# Patient Record
Sex: FEMALE | Race: WHITE | NOT HISPANIC OR LATINO | Employment: OTHER | URBAN - METROPOLITAN AREA
[De-identification: names, ages, dates, MRNs, and addresses within clinical notes are randomized per-mention and may not be internally consistent; named-entity substitution may affect disease eponyms.]

---

## 2024-01-04 ENCOUNTER — ANCILLARY PROCEDURE (OUTPATIENT)
Dept: RADIOLOGY | Facility: CLINIC | Age: 74
End: 2024-01-04
Payer: MEDICARE

## 2024-01-04 DIAGNOSIS — Z12.31 ENCOUNTER FOR SCREENING MAMMOGRAM FOR MALIGNANT NEOPLASM OF BREAST: ICD-10-CM

## 2024-01-04 PROCEDURE — 77067 SCR MAMMO BI INCL CAD: CPT | Performed by: RADIOLOGY

## 2024-01-04 PROCEDURE — 77063 BREAST TOMOSYNTHESIS BI: CPT | Performed by: RADIOLOGY

## 2024-01-04 PROCEDURE — 77063 BREAST TOMOSYNTHESIS BI: CPT

## 2024-01-09 ENCOUNTER — ANCILLARY PROCEDURE (OUTPATIENT)
Dept: RADIOLOGY | Facility: CLINIC | Age: 74
End: 2024-01-09
Payer: MEDICARE

## 2024-01-09 DIAGNOSIS — Z78.0 ASYMPTOMATIC MENOPAUSAL STATE: ICD-10-CM

## 2024-01-09 PROCEDURE — 77085 DXA BONE DENSITY AXL VRT FX: CPT

## 2024-01-09 PROCEDURE — 77085 DXA BONE DENSITY AXL VRT FX: CPT | Performed by: RADIOLOGY

## 2024-03-07 ENCOUNTER — OFFICE VISIT (OUTPATIENT)
Dept: PRIMARY CARE | Facility: CLINIC | Age: 74
End: 2024-03-07
Payer: MEDICARE

## 2024-03-07 VITALS
TEMPERATURE: 97.7 F | HEART RATE: 87 BPM | WEIGHT: 126.5 LBS | SYSTOLIC BLOOD PRESSURE: 109 MMHG | DIASTOLIC BLOOD PRESSURE: 64 MMHG | OXYGEN SATURATION: 94 %

## 2024-03-07 DIAGNOSIS — J98.01 BRONCHOSPASM: ICD-10-CM

## 2024-03-07 DIAGNOSIS — Z13.1 SCREENING FOR DIABETES MELLITUS (DM): ICD-10-CM

## 2024-03-07 DIAGNOSIS — Z13.220 SCREENING, LIPID: ICD-10-CM

## 2024-03-07 DIAGNOSIS — L30.8 OTHER ECZEMA: ICD-10-CM

## 2024-03-07 DIAGNOSIS — Z13.29 SCREENING FOR THYROID DISORDER: ICD-10-CM

## 2024-03-07 DIAGNOSIS — Z13.0 SCREENING, ANEMIA, DEFICIENCY, IRON: ICD-10-CM

## 2024-03-07 DIAGNOSIS — N95.2 ATROPHIC VAGINITIS: Primary | ICD-10-CM

## 2024-03-07 PROCEDURE — 1036F TOBACCO NON-USER: CPT | Performed by: FAMILY MEDICINE

## 2024-03-07 PROCEDURE — 1160F RVW MEDS BY RX/DR IN RCRD: CPT | Performed by: FAMILY MEDICINE

## 2024-03-07 PROCEDURE — 99204 OFFICE O/P NEW MOD 45 MIN: CPT | Performed by: FAMILY MEDICINE

## 2024-03-07 PROCEDURE — 1159F MED LIST DOCD IN RCRD: CPT | Performed by: FAMILY MEDICINE

## 2024-03-07 RX ORDER — CHOLECALCIFEROL (VITAMIN D3) 125 MCG
CAPSULE ORAL
COMMUNITY

## 2024-03-07 RX ORDER — ESTRADIOL 0.1 MG/G
CREAM VAGINAL
Qty: 42.5 G | Refills: 1 | Status: SHIPPED | OUTPATIENT
Start: 2024-03-07

## 2024-03-07 RX ORDER — ALBUTEROL SULFATE 90 UG/1
2 AEROSOL, METERED RESPIRATORY (INHALATION) EVERY 4 HOURS PRN
Qty: 18 G | Refills: 1 | Status: SHIPPED | OUTPATIENT
Start: 2024-03-07

## 2024-03-07 RX ORDER — PSYLLIUM HUSK 0.4 G
CAPSULE ORAL
COMMUNITY

## 2024-03-07 RX ORDER — PROMETHAZINE HYDROCHLORIDE 25 MG/1
TABLET ORAL
COMMUNITY
Start: 2017-06-14 | End: 2024-03-07 | Stop reason: WASHOUT

## 2024-03-07 RX ORDER — DIFLUPREDNATE OPHTHALMIC 0.5 MG/ML
1 EMULSION OPHTHALMIC 4 TIMES DAILY
COMMUNITY
Start: 2021-12-01 | End: 2024-03-07 | Stop reason: WASHOUT

## 2024-03-07 RX ORDER — CETIRIZINE HYDROCHLORIDE 10 MG/1
TABLET ORAL
COMMUNITY
Start: 2017-04-29 | End: 2024-03-07 | Stop reason: WASHOUT

## 2024-03-07 RX ORDER — ACETAMINOPHEN 500 MG
500 TABLET ORAL EVERY 4 HOURS PRN
COMMUNITY

## 2024-03-07 RX ORDER — ECONAZOLE NITRATE 10 MG/G
CREAM TOPICAL
COMMUNITY
Start: 2023-11-16 | End: 2024-03-07 | Stop reason: WASHOUT

## 2024-03-07 RX ORDER — PERPHENAZINE/AMITRIPTYLINE HCL 4MG-10MG
TABLET ORAL
COMMUNITY
End: 2024-04-02 | Stop reason: WASHOUT

## 2024-03-07 RX ORDER — BENZOCAINE .13; .15; .5; 2 G/100G; G/100G; G/100G; G/100G
GEL ORAL
COMMUNITY

## 2024-03-07 RX ORDER — IPRATROPIUM BROMIDE 42 UG/1
SPRAY, METERED NASAL
COMMUNITY
Start: 2018-03-08 | End: 2024-03-07 | Stop reason: WASHOUT

## 2024-03-07 RX ORDER — ZOLPIDEM TARTRATE 5 MG/1
10 TABLET ORAL NIGHTLY
COMMUNITY
End: 2024-03-07 | Stop reason: WASHOUT

## 2024-03-07 RX ORDER — VERAPAMIL HYDROCHLORIDE 40 MG/1
TABLET ORAL
COMMUNITY
Start: 2009-03-23 | End: 2024-03-07 | Stop reason: WASHOUT

## 2024-03-07 RX ORDER — CYCLOSPORINE 0.5 MG/ML
EMULSION OPHTHALMIC
COMMUNITY
Start: 2024-02-05

## 2024-03-07 RX ORDER — POLYETHYLENE GLYCOL 400 AND PROPYLENE GLYCOL 4; 3 MG/ML; MG/ML
1 SOLUTION/ DROPS OPHTHALMIC 4 TIMES DAILY PRN
COMMUNITY

## 2024-03-07 RX ORDER — LOTEPREDNOL ETABONATE 2 MG/ML
SUSPENSION/ DROPS OPHTHALMIC
COMMUNITY
Start: 2017-05-19 | End: 2024-03-07 | Stop reason: WASHOUT

## 2024-03-07 RX ORDER — MAGNESIUM 200 MG
TABLET ORAL
COMMUNITY
End: 2024-04-02 | Stop reason: WASHOUT

## 2024-03-07 RX ORDER — OMEPRAZOLE 40 MG/1
CAPSULE, DELAYED RELEASE ORAL
COMMUNITY
Start: 2016-10-03 | End: 2024-03-07 | Stop reason: WASHOUT

## 2024-03-07 RX ORDER — VIT C/E/ZN/COPPR/LUTEIN/ZEAXAN 250MG-90MG
CAPSULE ORAL
COMMUNITY

## 2024-03-07 RX ORDER — MOXIFLOXACIN 5 MG/ML
1 SOLUTION/ DROPS OPHTHALMIC 2 TIMES DAILY
COMMUNITY

## 2024-03-07 RX ORDER — ALBUTEROL SULFATE 90 UG/1
2 AEROSOL, METERED RESPIRATORY (INHALATION) EVERY 4 HOURS PRN
COMMUNITY
Start: 2016-12-12 | End: 2024-03-07 | Stop reason: SDUPTHER

## 2024-03-07 RX ORDER — DM/P-EPHED/ACETAMINOPH/DOXYLAM
LIQUID (ML) ORAL
COMMUNITY

## 2024-03-07 RX ORDER — FLAXSEED OIL 1000 MG
CAPSULE ORAL
COMMUNITY

## 2024-03-07 RX ORDER — BETAMETHASONE DIPROPIONATE 0.5 MG/G
LOTION TOPICAL 2 TIMES DAILY PRN
Qty: 60 ML | Refills: 0 | Status: SHIPPED | OUTPATIENT
Start: 2024-03-07 | End: 2025-03-07

## 2024-03-07 RX ORDER — LIDOCAINE 50 MG/G
PATCH TOPICAL
COMMUNITY
Start: 2019-02-19 | End: 2024-03-07 | Stop reason: WASHOUT

## 2024-03-07 RX ORDER — METHYLPREDNISOLONE 4 MG/1
TABLET ORAL
COMMUNITY
Start: 2024-01-25 | End: 2024-03-07 | Stop reason: WASHOUT

## 2024-03-07 RX ORDER — PANTOPRAZOLE SODIUM 40 MG/1
TABLET, DELAYED RELEASE ORAL
COMMUNITY
Start: 2018-11-28 | End: 2024-03-07 | Stop reason: WASHOUT

## 2024-03-07 RX ORDER — BETAMETHASONE DIPROPIONATE 0.5 MG/G
LOTION TOPICAL 2 TIMES DAILY PRN
Qty: 60 ML | Refills: 0 | Status: SHIPPED | OUTPATIENT
Start: 2024-03-07 | End: 2024-03-07 | Stop reason: SDUPTHER

## 2024-03-07 RX ORDER — ZINC GLUCONATE 50 MG
50 TABLET ORAL
COMMUNITY
End: 2024-04-02 | Stop reason: WASHOUT

## 2024-03-07 RX ORDER — CEVIMELINE HYDROCHLORIDE 30 MG/1
1 CAPSULE ORAL 2 TIMES DAILY
COMMUNITY
Start: 2018-08-21 | End: 2024-03-07 | Stop reason: WASHOUT

## 2024-03-07 NOTE — PROGRESS NOTES
Subjective   Patient ID: Mariah Kelly is a 73 y.o. female who presents for New Patient Visit, Dry skin (Pt has a dry patch on her back that itches. ), PT Treatment (Patient is seeing PT for shoulder and back issues and is under care with a doctor at [facility]. 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  HPI  New to the practice  Dtr is a pt here- keya merino   ; she and spouse are establishing with me.  They moved from [facility] to this area .  They are retired .     They both had AWV in November and wish to plan for the same this year.  They are here today to meet me / get established.       PMHx  Chronic keratitis  / [place] of [place]   Right rotator cuff tear  - seeing PT and Ortho at [facility]   Back problem   -lower -seeing [facility]     Recurrent bronchitis intermittent wheezing when has a bad case  - albuterol relieves it .   Itchy area on back . Was prescribed a cream, very expensive, did not fill it.  Uses aveeno lotion  .  Heat (shower)makes it itch more.     Vaginal dryness/ menopause.     Has recently had a routine mammo ,  and a bone density .      Review of Systems  Denies hx of heart disease,  diabetes,  breast ca , colon polyps .  Nonsmoker.       Current Outpatient Medications   Medication Sig Dispense Refill    acetaminophen (Tylenol) 500 mg tablet Take 1 tablet (500 mg) by mouth every 4 hours if needed.      B complex-vitamin C-folic acid (Nephro-Isabel Rx) 1- mg-mg-mcg tablet Take 1 tablet by mouth once daily with breakfast.      biotin 5,000 mcg tablet,disintegrating Take by mouth.      budesonide (Rhinocort AQ) 32 mcg/actuation nasal spray APPLY 1 TO 2 Sprays in each nostril once  DAilY      cholecalciferol (Vitamin D-3) 25 MCG (1000 UT) capsule Take by mouth once daily.      coconut oiL 1,000 mg capsule Take by mouth once daily.      flaxseed oiL 1,000 mg capsule Take by mouth.      lactobacillus  (Culturelle) 10 billion cell capsule Take 1 capsule by mouth once daily.      magnesium 200 mg tablet Take by mouth.      moxifloxacin (Vigamox) 0.5 % ophthalmic solution Administer 1 drop into the left eye 2 times a day.      oregano oiL 1,500 mg capsule Take by mouth once daily.      peg 400-propylene glycol (Systane Ultra) 0.4-0.3 % drops ophthalmic drops Administer 1 drop into affected eye(s) 4 times a day as needed.      psyllium (Metamucil) 0.4 gram capsule Take by mouth once daily.      Restasis 0.05 % ophthalmic emulsion USE ONE DROP INTO THE SURGICAL EYE EVERY 12 HOURS      zinc gluconate 50 mg tablet Take 1 tablet (50 mg) by mouth once daily.      albuterol 90 mcg/actuation inhaler Inhale 2 puffs every 4 hours if needed for wheezing or shortness of breath. 18 g 1    betamethasone dipropionate (Diprosone) 0.05 % lotion Apply topically 2 times a day as needed for irritation or rash. 60 mL 0    estradiol (Estrace) 0.01 % (0.1 mg/gram) vaginal cream 0.5 gm on external genitalia  and vaginal x 10 days then 3 x a week . 42.5 g 1     No current facility-administered medications for this visit.       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     Objective   /64 (BP Location: Right arm, Patient Position: Sitting, BP Cuff Size: Adult)   Pulse 87   Temp 36.5 °C (97.7 °F) (Temporal)   Wt 57.4 kg (126 lb 8 oz)   SpO2 94%     Physical Exam  Vitals reviewed.   Constitutional:       General: She is not in acute distress.  Cardiovascular:      Rate and Rhythm: Normal rate and regular rhythm.      Heart sounds: Normal heart sounds.   Pulmonary:      Effort: Pulmonary effort is normal.      Breath sounds: No wheezing or rales.   Neurological:      General: No focal deficit present.      Mental Status: She is alert.       Skin: low back:  dry skin ,  mild erythema    Assessment/Plan   Problem List Items Addressed This Visit    None  Visit Diagnoses        Atrophic vaginitis    -  Primary    Relevant Medications    estradiol (Estrace) 0.01 % (0.1 mg/gram) vaginal cream    Other Relevant Orders    Follow Up In [facility] - PCP - Medicare Annual    Bronchospasm        Relevant Medications    albuterol 90 mcg/actuation inhaler    Other Relevant Orders    Follow Up In [facility] - [facility] - Medicare Annual    Other eczema        Relevant Medications    betamethasone dipropionate (Diprosone) 0.05 % lotion    Other Relevant Orders    Follow Up In [facility] - [facility] - Medicare Annual    Screening for diabetes mellitus (DM)        Relevant Orders    Comprehensive Metabolic Panel    Screening, lipid        Relevant Orders    Lipid Panel    Screening for thyroid disorder        Relevant Orders    TSH with reflex to Free T4 if abnormal    Screening, anemia, deficiency, iron              Localized eczema  -stop scratching / rubbing  - continue lotion  - avoid hot baths/ showers  - topical steroid     At Vaginitis  - s/p hyster, and single oophorectomy  - trial on medication - estrace  (is usually less expensive than premarin) if still too costly, can reach out to office, I can see if our pharmacist can be of help.     Bronchospasm , periodic  - due for refill of med     Lab and followup in November     SURINDER Plunkett MD

## 2024-04-02 ENCOUNTER — OFFICE VISIT (OUTPATIENT)
Dept: PRIMARY CARE | Facility: CLINIC | Age: 74
End: 2024-04-02
Payer: MEDICARE

## 2024-04-02 VITALS
TEMPERATURE: 98.5 F | SYSTOLIC BLOOD PRESSURE: 162 MMHG | WEIGHT: 127 LBS | OXYGEN SATURATION: 96 % | HEART RATE: 84 BPM | DIASTOLIC BLOOD PRESSURE: 66 MMHG

## 2024-04-02 DIAGNOSIS — J01.80 OTHER ACUTE SINUSITIS, RECURRENCE NOT SPECIFIED: Primary | ICD-10-CM

## 2024-04-02 DIAGNOSIS — J02.9 PHARYNGITIS, UNSPECIFIED ETIOLOGY: ICD-10-CM

## 2024-04-02 DIAGNOSIS — R05.9 COUGH, UNSPECIFIED TYPE: ICD-10-CM

## 2024-04-02 PROCEDURE — 1036F TOBACCO NON-USER: CPT | Performed by: FAMILY MEDICINE

## 2024-04-02 PROCEDURE — 99214 OFFICE O/P EST MOD 30 MIN: CPT | Performed by: FAMILY MEDICINE

## 2024-04-02 PROCEDURE — 1160F RVW MEDS BY RX/DR IN RCRD: CPT | Performed by: FAMILY MEDICINE

## 2024-04-02 PROCEDURE — 1159F MED LIST DOCD IN RCRD: CPT | Performed by: FAMILY MEDICINE

## 2024-04-02 RX ORDER — AMOXICILLIN 500 MG/1
CAPSULE ORAL
Qty: 40 CAPSULE | Refills: 0 | Status: SHIPPED | OUTPATIENT
Start: 2024-04-02 | End: 2024-04-19 | Stop reason: WASHOUT

## 2024-04-02 NOTE — PROGRESS NOTES
Subjective      HPI:    Mariah Mir Is 73 y.o.. female. Here for acute visit-     PCP is - Dr Plunkett pt          Chief Complaint   Patient presents with    Sore Throat    Cough    chest congestion         What concern/ problem/pain/symptom  brings you here today?  Pharyngitis, chills, chest congestion      how long has pt had sxs?  Sunday morning      describe symptoms-    fever- possibly  nausea- no  vomiting- no  Cough/congestion        Are symptoms all day ? yes  Do symptoms occur at night?  yes      has pt tried anything for current symptoms, including medications (OTC or prescription)  ?  Robitussin      what makes symptoms worse?  unknown      Does anything make symptoms better?  unknown      has pt been seen recently for this problem ( within past 2-3 weeks) ?  [facility] Sunday    if yes- where?     by who?    what treatment was provided? Neg strep, no medication      Social History     Tobacco Use   Smoking Status Never   Smokeless Tobacco Never        reports current alcohol use.       Objective            Vitals:    04/02/24 1358   BP: 162/66   BP Location: Left arm   Patient Position: Sitting   BP Cuff Size: Adult   Pulse: 84   Temp: 36.9 °C (98.5 °F)   TempSrc: Temporal   SpO2: 96%   Weight: 57.6 kg (127 lb)           PHYSICAL:      Pt is A and O x3, NAD, Vital signs are within normal limits  General Appearance- normal , good hygiene,talks easily  EYES- conjunctiva and lids- normal  EARS/NOSE-TM's normal, head normocephalic and atraumatic, nasopharynx-green nasal discharge, no trismus, no hot potato voice  OROPHARYNX- no exudate  NECK- supple, FROM  LYMPH- no cervical lymph nodes palpated   CV- RRR without murmur  PULM- CTA bilaterally  Respiratory effort- normal respiratory effort , no retractions or nasal flaring   ABDOMEN- normoactive BS's   EXTREMITIES-no edema,NT  SKIN- no abnormal skin lesions on inspection or palpation   NEURO- no focal deficits  PSYCH- pleasant, normal judgement and insight      BP  Readings from Last 3 Encounters:   04/02/24 162/66   03/07/24 109/64         Wt Readings from Last 3 Encounters:   04/02/24 57.6 kg (127 lb)   03/07/24 57.4 kg (126 lb 8 oz)       BMI Readings from Last 3 Encounters:   No data found for BMI           The number and complexity of problems addressed is considered moderate.  The amount and/or complexity of data reviewed and analyzed is considered moderate. The risk of complications and/or morbidity/mortality of patient is considered moderate. Overall, this patient encounter is considered a moderate risk visit.      What are antibiotics?  Antibiotics are medicines that help people fight infections caused by bacteria. They work by killing bacteria that are in the body. These medicines come in many different forms, including pills, ointments, and liquids that are given by injection.    Antibiotics can do a lot of good. For people with serious bacterial infections, antibiotics can save lives. But people use them far too often, even when they're not needed. This is causing a very serious problem called antibiotic resistance. Antibiotic resistance happens when bacteria that have been exposed to an antibiotic change so that the antibiotic can no longer kill them. In those bacteria, the antibiotic has no effect. Because of this problem, doctors are having a harder and harder time treating infections. Experts worry that there will soon be infections that don't respond to any antibiotics.    When are antibiotics helpful?  Antibiotics can help people fight off infections caused by bacteria. They do not work on infections caused by viruses.    Some common bacterial infections that are treated with antibiotics include:    Strep throat    Pneumonia (an infection of the lungs)    Bladder infections    Infections you catch through sex, such as gonorrhea and chlamydia    When are antibiotics NOT helpful?    Antibiotics do not work on infections caused by viruses.    Antibiotics are not  helpful for the common cold, because the common cold is caused by a virus.    Antibiotics are not helpful for the flu, because the flu is caused by a virus. (People with the flu can be treated with medicines called antiviral medicines, which are different from antibiotics.)      Even though antibiotics don't work on infections caused by viruses, people sometimes believe that they do. That's because they took antibiotics for a viral infection before and then got better. The problem is that those people would have gotten better with or without an antibiotic. When they get better with the antibiotic, they think that's what cured them, when in reality the antibiotic had nothing to do with it.    What's the harm in taking antibiotics even if they might not help?  There are many reasons you should not take antibiotics unless you absolutely need them:    Antibiotics cause side effects, such as nausea, vomiting, and diarrhea. They can even make it more likely that you will get a different kind of infection, such as yeast infection (if you are a woman).    Allergies to antibiotics are common. You can develop an allergy to an antibiotic, even if you have not had a problem with it before. Some allergies are just unpleasant, causing rashes and itching. But some can be very serious and even life-threatening. It is better to avoid any risk of an allergy, if the antibiotic wouldn't help you anyway.    Overuse of antibiotics leads to antibiotic resistance. Using antibiotics when they are not needed gives bacteria a chance to change, so that the antibiotics cannot hurt them later on. People who have infections caused by antibiotic-resistant bacteria often have to be treated in the hospital with many different antibiotics. People can even die from these infections, because there is no antibiotic that will cure them.    When should I take antibiotics?  You should take antibiotics only when a doctor or nurse prescribes them to you. You  "should never take antibiotics prescribed to someone else, and you should not take antibiotics that were prescribed to you for a previous illness. When prescribing an antibiotic, doctors and nurses have to carefully pick the right antibiotic for a particular infection. Not all antibiotics work on all bacteria.    If you do have an infection caused by a bacteria, your doctor or nurse might want to find out what that bacteria is, and which antibiotics can kill it. They do this by taking a \"culture\" of the bacteria and growing it in the lab. But it's not possible to do a culture on someone who has already started an antibiotic. So if you start an antibiotic without input from a doctor or nurse it will be impossible to know if you have taken the right one.    What can I do to reduce antibiotic resistance?  Here are some things that can help:    Do not pressure your doctor or nurse for antibiotics when they do not think you need them.    If you are prescribed antibiotics, finish all of the medicine and take it exactly as directed. Never skip doses or stop taking the medicine without talking to your doctor or nurse.    Do not give antibiotics that were prescribed to you to anyone else.    What if my doctor prescribes an antibiotic that did not work for me before?  If an antibiotic did not work for you before, that does not mean it will never work for you. If you have used an antibiotic before and it did not work, tell your doctor. But keep in mind that the infection you had before might not have been caused by the same bacteria that you have now. The \"best\" antibiotic is the right one for the bacteria causing the infection, not for the person with the infection.    What if I am allergic to an antibiotic?  If you had a bad reaction to an antibiotic, tell your doctor or nurse about it. But do not assume you are allergic unless your doctor or nurse tells you that you are.    Many people who think they are allergic to an " antibiotic are wrong. If you get nauseous after taking an antibiotic, that does not mean you are allergic to it. Nausea is a common side effect of many antibiotics. If you are a woman and you get a yeast infection after taking an antibiotic, that does not mean you are allergic to it. Yeast infections are a common side effect of antibiotics.    Symptoms of antibiotic allergy can be mild and include a flat rash and itching. They can also be more serious and include:    Hives - Hives are raised, red patches of skin that are usually very itchy (picture 1).    Lip or tongue swelling    Trouble swallowing or breathing    Serious allergy symptoms can start right after you start taking an antibiotic if you are very sensitive. Less serious symptoms, on the other hand, often start a day or more later.    Almost all antibiotics may cause possible side effects of allergic reaction, nausea, vomiting, diarrhea- most worrisome caused by C. diff, and secondary yeast infection.        Assessment/Plan        1. Other acute sinusitis, recurrence not specified        2. Cough, unspecified type        3. Pharyngitis, unspecified etiology              No orders of the defined types were placed in this encounter.                Call if no better or if symptoms worsen

## 2024-04-19 ENCOUNTER — OFFICE VISIT (OUTPATIENT)
Dept: PRIMARY CARE | Facility: CLINIC | Age: 74
End: 2024-04-19
Payer: MEDICARE

## 2024-04-19 VITALS
WEIGHT: 128.5 LBS | SYSTOLIC BLOOD PRESSURE: 140 MMHG | OXYGEN SATURATION: 95 % | DIASTOLIC BLOOD PRESSURE: 78 MMHG | HEART RATE: 98 BPM | TEMPERATURE: 97 F

## 2024-04-19 DIAGNOSIS — J06.9 UPPER RESPIRATORY TRACT INFECTION, UNSPECIFIED TYPE: Primary | ICD-10-CM

## 2024-04-19 PROCEDURE — 1160F RVW MEDS BY RX/DR IN RCRD: CPT | Performed by: FAMILY MEDICINE

## 2024-04-19 PROCEDURE — 1159F MED LIST DOCD IN RCRD: CPT | Performed by: FAMILY MEDICINE

## 2024-04-19 PROCEDURE — 99214 OFFICE O/P EST MOD 30 MIN: CPT | Performed by: FAMILY MEDICINE

## 2024-04-19 RX ORDER — BENZONATATE 100 MG/1
100 CAPSULE ORAL 3 TIMES DAILY PRN
Qty: 20 CAPSULE | Refills: 0 | Status: SHIPPED | OUTPATIENT
Start: 2024-04-19 | End: 2024-04-26

## 2024-04-19 RX ORDER — FLUTICASONE FUROATE AND VILANTEROL 100; 25 UG/1; UG/1
1 POWDER RESPIRATORY (INHALATION) DAILY
Qty: 28 EACH | Refills: 1 | Status: SHIPPED | OUTPATIENT
Start: 2024-04-19

## 2024-04-19 ASSESSMENT — ENCOUNTER SYMPTOMS
COUGH: 1
FEVER: 0
APNEA: 0
SHORTNESS OF BREATH: 0
PALPITATIONS: 0
CHEST TIGHTNESS: 0
CHILLS: 0
WHEEZING: 1

## 2024-04-19 NOTE — PATIENT INSTRUCTIONS
Ms. Kelly,    Stop taking benadryl and robitussin. Instead,  take Tessalon  and  use theBreo inhaler  daily    Followup if not improving.     Blood pressure may needto be treated.  130/80 or less .    Dr. Plunkett

## 2024-04-19 NOTE — PROGRESS NOTES
Subjective   Patient ID: Mariah Kelly is a 73 y.o. female who presents for Cough, Nasal Congestion, Headache, and Sore Throat.  [facility]    Surgery   May 6  ;  [facility] ; right shoulder  arthroscopic     Ill with current sxs for several weeks.  Mostly melinda,r but still has dry cough .  Nagging.  Sore throat as well.     Review of Systems   Constitutional:  Negative for chills and fever.   Respiratory:  Positive for cough and wheezing. Negative for apnea, chest tightness and shortness of breath.    Cardiovascular:  Negative for chest pain, palpitations and leg swelling.     Denies hx of diff w anesthesia. No hx of HTN .  No hx of clots     Objective   /78   Pulse 98   Temp 36.1 °C (97 °F) (Temporal)   Wt 58.3 kg (128 lb 8 oz)   SpO2 95%     Physical Exam  Vitals reviewed.   Constitutional:       General: She is not in acute distress.  HENT:      Right Ear: Tympanic membrane normal.      Left Ear: Tympanic membrane normal.      Mouth/Throat:      Pharynx: Posterior oropharyngeal erythema present. No oropharyngeal exudate.   Eyes:      Extraocular Movements: Extraocular movements intact.      Conjunctiva/sclera: Conjunctivae normal.      Pupils: Pupils are equal, round, and reactive to light.   Cardiovascular:      Rate and Rhythm: Normal rate and regular rhythm.      Heart sounds: Normal heart sounds.   Pulmonary:      Effort: Pulmonary effort is normal.      Breath sounds: No wheezing or rales.   Musculoskeletal:      Cervical back: No rigidity.   Lymphadenopathy:      Cervical: No cervical adenopathy.   Neurological:      General: No focal deficit present.      Mental Status: She is alert.         Assessment/Plan   Problem List Items Addressed This Visit    None  Visit Diagnoses       Upper respiratory tract infection, unspecified type    -  Primary    Relevant Medications    benzonatate (Tessalon Perles) 100 mg capsule    fluticasone furoate-vilanteroL (Breo Ellipta) 100-25 mcg/dose inhaler             Post infectious cough ,  and lung inflammation .       SURINDER Plunkett MD

## 2024-09-13 ENCOUNTER — TELEPHONE (OUTPATIENT)
Dept: PRIMARY CARE | Facility: CLINIC | Age: 74
End: 2024-09-13
Payer: MEDICARE

## 2024-09-13 DIAGNOSIS — J34.2 NASAL SEPTAL DEVIATION: Primary | ICD-10-CM

## 2024-09-14 NOTE — TELEPHONE ENCOUNTER
Advised pt that referral was placed. Gave Rosey direct number for pt to call next week to schedule.

## 2024-11-21 ENCOUNTER — APPOINTMENT (OUTPATIENT)
Dept: PRIMARY CARE | Facility: CLINIC | Age: 74
End: 2024-11-21
Payer: MEDICARE

## 2024-11-25 ENCOUNTER — LAB (OUTPATIENT)
Dept: LAB | Facility: LAB | Age: 74
End: 2024-11-25
Payer: MEDICARE

## 2024-11-25 DIAGNOSIS — Z13.29 SCREENING FOR THYROID DISORDER: ICD-10-CM

## 2024-11-25 DIAGNOSIS — Z91.09 ENVIRONMENTAL ALLERGIES: ICD-10-CM

## 2024-11-25 DIAGNOSIS — Z13.1 SCREENING FOR DIABETES MELLITUS (DM): ICD-10-CM

## 2024-11-25 DIAGNOSIS — Z13.220 SCREENING, LIPID: ICD-10-CM

## 2024-11-25 PROCEDURE — 84443 ASSAY THYROID STIM HORMONE: CPT

## 2024-11-25 PROCEDURE — 80053 COMPREHEN METABOLIC PANEL: CPT

## 2024-11-25 PROCEDURE — 86003 ALLG SPEC IGE CRUDE XTRC EA: CPT

## 2024-11-25 PROCEDURE — 36415 COLL VENOUS BLD VENIPUNCTURE: CPT

## 2024-11-25 PROCEDURE — 82785 ASSAY OF IGE: CPT

## 2024-11-25 PROCEDURE — 80061 LIPID PANEL: CPT

## 2024-11-26 ENCOUNTER — APPOINTMENT (OUTPATIENT)
Dept: OTOLARYNGOLOGY | Facility: CLINIC | Age: 74
End: 2024-11-26
Payer: MEDICARE

## 2024-11-26 VITALS — HEIGHT: 61 IN | WEIGHT: 128 LBS | BODY MASS INDEX: 24.17 KG/M2

## 2024-11-26 DIAGNOSIS — J34.3 HYPERTROPHY OF BOTH INFERIOR NASAL TURBINATES: ICD-10-CM

## 2024-11-26 DIAGNOSIS — R09.81 NASAL CONGESTION: ICD-10-CM

## 2024-11-26 DIAGNOSIS — J34.2 NASAL SEPTAL DEVIATION: ICD-10-CM

## 2024-11-26 DIAGNOSIS — Z91.09 ENVIRONMENTAL ALLERGIES: ICD-10-CM

## 2024-11-26 DIAGNOSIS — J34.89 NASAL OBSTRUCTION: ICD-10-CM

## 2024-11-26 DIAGNOSIS — J32.9 CHRONIC RHINOSINUSITIS: Primary | ICD-10-CM

## 2024-11-26 DIAGNOSIS — J34.89 NASAL DRAINAGE: ICD-10-CM

## 2024-11-26 DIAGNOSIS — J34.2 DEVIATED NASAL SEPTUM: ICD-10-CM

## 2024-11-26 DIAGNOSIS — R44.8 FACIAL PRESSURE: ICD-10-CM

## 2024-11-26 LAB
ALBUMIN SERPL BCP-MCNC: 4.6 G/DL (ref 3.4–5)
ALP SERPL-CCNC: 93 U/L (ref 33–136)
ALT SERPL W P-5'-P-CCNC: 25 U/L (ref 7–45)
ANION GAP SERPL CALC-SCNC: 12 MMOL/L (ref 10–20)
AST SERPL W P-5'-P-CCNC: 19 U/L (ref 9–39)
BILIRUB SERPL-MCNC: 0.8 MG/DL (ref 0–1.2)
BUN SERPL-MCNC: 19 MG/DL (ref 6–23)
CALCIUM SERPL-MCNC: 9.6 MG/DL (ref 8.6–10.6)
CHLORIDE SERPL-SCNC: 104 MMOL/L (ref 98–107)
CHOLEST SERPL-MCNC: 218 MG/DL (ref 0–199)
CHOLESTEROL/HDL RATIO: 3.4
CO2 SERPL-SCNC: 27 MMOL/L (ref 21–32)
CREAT SERPL-MCNC: 0.9 MG/DL (ref 0.5–1.05)
EGFRCR SERPLBLD CKD-EPI 2021: 67 ML/MIN/1.73M*2
GLUCOSE SERPL-MCNC: 100 MG/DL (ref 74–99)
HDLC SERPL-MCNC: 65 MG/DL
LDLC SERPL CALC-MCNC: 126 MG/DL
NON HDL CHOLESTEROL: 153 MG/DL (ref 0–149)
POTASSIUM SERPL-SCNC: 4.2 MMOL/L (ref 3.5–5.3)
PROT SERPL-MCNC: 7.8 G/DL (ref 6.4–8.2)
SODIUM SERPL-SCNC: 139 MMOL/L (ref 136–145)
TRIGL SERPL-MCNC: 136 MG/DL (ref 0–149)
TSH SERPL-ACNC: 1.28 MIU/L (ref 0.44–3.98)
VLDL: 27 MG/DL (ref 0–40)

## 2024-11-26 PROCEDURE — 3008F BODY MASS INDEX DOCD: CPT | Performed by: STUDENT IN AN ORGANIZED HEALTH CARE EDUCATION/TRAINING PROGRAM

## 2024-11-26 PROCEDURE — 31231 NASAL ENDOSCOPY DX: CPT | Performed by: STUDENT IN AN ORGANIZED HEALTH CARE EDUCATION/TRAINING PROGRAM

## 2024-11-26 PROCEDURE — 99204 OFFICE O/P NEW MOD 45 MIN: CPT | Performed by: STUDENT IN AN ORGANIZED HEALTH CARE EDUCATION/TRAINING PROGRAM

## 2024-11-26 PROCEDURE — 1159F MED LIST DOCD IN RCRD: CPT | Performed by: STUDENT IN AN ORGANIZED HEALTH CARE EDUCATION/TRAINING PROGRAM

## 2024-11-26 PROCEDURE — 1036F TOBACCO NON-USER: CPT | Performed by: STUDENT IN AN ORGANIZED HEALTH CARE EDUCATION/TRAINING PROGRAM

## 2024-11-26 RX ORDER — DOXYCYCLINE 100 MG/1
100 CAPSULE ORAL 2 TIMES DAILY
Qty: 20 CAPSULE | Refills: 0 | Status: SHIPPED | OUTPATIENT
Start: 2024-11-26 | End: 2024-12-06

## 2024-11-26 RX ORDER — AZELASTINE 1 MG/ML
1 SPRAY, METERED NASAL 2 TIMES DAILY PRN
Qty: 36 ML | Refills: 3 | Status: SHIPPED | OUTPATIENT
Start: 2024-11-26 | End: 2025-11-26

## 2024-11-26 RX ORDER — FLUTICASONE PROPIONATE 50 MCG
1 SPRAY, SUSPENSION (ML) NASAL 2 TIMES DAILY
Qty: 48 G | Refills: 3 | Status: SHIPPED | OUTPATIENT
Start: 2024-11-26 | End: 2025-11-26

## 2024-11-26 RX ORDER — SOD CHLOR,BICARB/SQUEEZ BOTTLE
PACKET, WITH RINSE DEVICE NASAL
Qty: 1 EACH | Refills: 3 | Status: SHIPPED | OUTPATIENT
Start: 2024-11-26

## 2024-11-26 RX ORDER — AZELASTINE 1 MG/ML
1 SPRAY, METERED NASAL 2 TIMES DAILY
Qty: 36 ML | Refills: 3 | Status: SHIPPED | OUTPATIENT
Start: 2024-11-26 | End: 2024-11-26

## 2024-11-26 RX ORDER — PREDNISONE 10 MG/1
TABLET ORAL
Qty: 24 TABLET | Refills: 0 | Status: SHIPPED | OUTPATIENT
Start: 2024-11-26

## 2024-11-26 NOTE — PROGRESS NOTES
HPI  74-year-old female presenting for initial evaluation of multiple sinonasal complaints.  Main Symptoms: Daily sinonasal symptoms include bilateral nasal congestion/obstruction, anterior and posterior nasal drainage, facial pressure along the maxillary regions bilaterally  Duration: Years  Laterality: Bilateral  Endorses history of environmental allergies  Also reports snoring    Patient denies facial pain, facal numbness, ansomia, dental pain, immunotherapy.    No odynophagia/dysphagia/SOB/dyspnea/hoarseness/fevers/chills/weight loss/night sweats.    Current treatment:  Nasal Steroid: Rhinocort   Sinus Rinse: No  Antihistamine: No  Prednisone: No  Other: None    Recent CT: None    Previous nasal/sinus surgery: None     History reviewed. No pertinent past medical history.     Past Surgical History:   Procedure Laterality Date    HYSTERECTOMY  08/07/2018    Hysterectomy    OTHER SURGICAL HISTORY  08/07/2018    Hand Repair        Current Outpatient Medications   Medication Instructions    acetaminophen (TYLENOL) 500 mg, Every 4 hours PRN    albuterol 90 mcg/actuation inhaler 2 puffs, inhalation, Every 4 hours PRN    B complex-vitamin C-folic acid (Nephro-Isabel Rx) 1- mg-mg-mcg tablet 1 tablet, Daily with breakfast    betamethasone dipropionate (Diprosone) 0.05 % lotion Topical, 2 times daily PRN    biotin 5,000 mcg tablet,disintegrating Take by mouth.    budesonide (Rhinocort AQ) 32 mcg/actuation nasal spray APPLY 1 TO 2 Sprays in each nostril once  DAilY    cholecalciferol (Vitamin D-3) 25 MCG (1000 UT) capsule Daily RT    estradiol (Estrace) 0.01 % (0.1 mg/gram) vaginal cream 0.5 gm on external genitalia  and vaginal x 10 days then 3 x a week .    flaxseed oiL 1,000 mg capsule Take by mouth.    fluticasone furoate-vilanteroL (Breo Ellipta) 100-25 mcg/dose inhaler 1 puff, inhalation, Daily    lactobacillus (Culturelle) 10 billion cell capsule 1 capsule, Daily RT    moxifloxacin (Vigamox) 0.5 % ophthalmic  solution 1 drop, 2 times daily    oregano oiL 1,500 mg capsule Daily RT    peg 400-propylene glycol (Systane Ultra) 0.4-0.3 % drops ophthalmic drops 1 drop, 4 times daily PRN    psyllium (Metamucil) 0.4 gram capsule Daily RT    Restasis 0.05 % ophthalmic emulsion USE ONE DROP INTO THE SURGICAL EYE EVERY 12 HOURS        Allergies   Allergen Reactions    Azithromycin Diarrhea, Other and Unknown     3 day dose pack causes stomach issues but can take 5 day dose    Montelukast Hives and Itching    Levofloxacin Other     Hard time sleeping    Loratadine Other     Will not take because daughters are allergic to claritin    Meperidine (Pf) Swelling        Review of Systems  A detailed 12 point ROS was performed and is negative except as noted in the intake form, HPI and/or Past Medical History    Physical Exam   CONSTITUTIONAL: Well developed, well nourished.  VOICE: Normal voice quality  RESPIRATION: Breathing comfortably, no stridor.  CV: No clubbing/cyanosis/edema in hands.  EYES: EOM Intact, sclera normal.  NEURO: Alert and oriented times 3, Cranial nerves V,VII intact and symmetric bilaterally.  HEAD AND FACE: Symmetric facial features, no masses or lesions, sinuses nontender to palpation.  SALIVARY GLANDS: Parotid and submandibular glands normal bilaterally.  EARS: Normal external ears, external auditory canals, and TMs to otoscopy  + NOSE: External nose midline, anterior rhinoscopy is normal with limited visualization to the anterior aspect of the interior turbinates. No lesions noted.  Bilateral inferior turbinate hypertrophy  Right septal deviation, left posterior spur  ORAL CAVITY/OROPHARYNX/LIPS: Normal mucous membranes, normal floor of mouth/tongue/OP, no masses or lesions are noted.  PHARYNGEAL WALLS AND NASOPHARYNX: No masses noted. Mucosa appears clean and moist  NECK/LYMPH: No LAD, no thyroid masses. Trachea palpably midline  SKIN: Neck skin is without injury  PSYCH: Alert and oriented with appropriate mood  and affect     Nasal endoscopy:  PROCEDURE NOTE    For better visualization because of septal deviation, turbinate hypertrophy nasal endoscopy was performed after verbal consent was obtained by the patient and/or guardian. Both nostrils were sprayed with a mixture of lidocaine 4% and Afrin. After a sufficient amount of time elapsed for mucosal anesthesia to take place, the nasal endoscope was advanced into the nostril.    The following areas were visualized:  Nasal passage, nasal septum, turbinates, middle meatus, nasopharynx, sinus ostia    The patient tolerated the procedure well and these structures were found to be normal except as follows:  Bilateral inferior turbinate hypertrophy  Right septal deviation, left posterior spur  Bilateral generalized mucosal edema  No mucopurulence, polyps, nor masses seen in inferior meati, middle meati, nor sphenoethmoidal recesses bilaterally.     PROCEDURE NOTE:  FLEXIBLE NASOLARYNGOSCOPY     The risks, benefits, and alternatives were explained.  The patient wished to proceed and provided verbal consent.       INDICATIONS: To visualize anatomy in specific detail; evaluation of symptomatic disorder involving the voice, swallowing, or upper aerodigestive tract, including MAR.      DESCRIPTION OF PROCEDURE: After adequate afrin and lidocaine spray, I advanced the endoscope into the nasal cavity.  The nasal cavity, nasopharynx, tongue base, vallecula, posterior/lateral pharyngeal walls, pyriform, epiglottis, post cricoid area, and larynx were within normal limits.  The following specific findings should be noted:  No lesions/masses  Mobile TVCs bilaterally, complete glottic closure  No pooling of secretions     The patient tolerated the procedure without difficulty.     Assessment  Chronic rhinosinusitis  Facial pressure  Nasal drainage  Nasal airway obstruction  Nasal septal deviation  Bilateral inferior turbinate hypertrophy      Plan  The patient and I discussed their current  nasal / sinus symptoms as well as several therapeutic options. I would like to begin a nasal hygiene/ medical regimen consisting of Flonase, azelastine, saline irrigations.  RAST panel ordered to evaluate for environmental allergies  Doxycycline and prednisone course prescribed after which we will proceed with CT sinus to further evaluate the region    The patient was instructed on the correct technique to administer the topical nasal spray (s) aiming at the lateral nasal wall for maximal efficacy and to avoid irritation to the septum which could lead to epistaxis. I stressed consistency of usage for maximal benefit. We discussed the rationale for the timing of this therapy and the benefits and potential adverse effects.      RTC 6w

## 2024-11-30 LAB
A ALTERNATA IGE QN: 1.03 KU/L
A FUMIGATUS IGE QN: <0.1 KU/L
BERMUDA GRASS IGE QN: <0.1 KU/L
BOXELDER IGE QN: ABNORMAL
C HERBARUM IGE QN: <0.1 KU/L
CALIF WALNUT POLN IGE QN: ABNORMAL
CAT DANDER IGE QN: <0.1 KU/L
CMN PIGWEED IGE QN: <0.1 KU/L
COMMON RAGWEED IGE QN: 1.5 KU/L
COTTONWOOD IGE QN: <0.1 KU/L
D FARINAE IGE QN: <0.1 KU/L
D PTERONYSS IGE QN: <0.1 KU/L
DOG DANDER IGE QN: <0.1 KU/L
ENGL PLANTAIN IGE QN: <0.1 KU/L
GOOSEFOOT IGE QN: <0.1 KU/L
JOHNSON GRASS IGE QN: <0.1 KU/L
KENT BLUE GRASS IGE QN: <0.1 KU/L
LONDON PLANE IGE QN: <0.1 KU/L
MT JUNIPER IGE QN: <0.1 KU/L
P NOTATUM IGE QN: ABNORMAL
PECAN/HICK TREE IGE QN: <0.1 KU/L
ROACH IGE QN: 0.45 KU/L
SALTWORT IGE QN: <0.1 KU/L
SHEEP SORREL IGE QN: <0.1 KU/L
SILVER BIRCH IGE QN: <0.1 KU/L
TIMOTHY IGE QN: <0.1 KU/L
TOTAL IGE SMQN RAST: 168 KU/L
WHITE ASH IGE QN: <0.1 KU/L
WHITE ELM IGE QN: <0.1 KU/L
WHITE MULBERRY IGE QN: <0.1 KU/L
WHITE OAK IGE QN: <0.1 KU/L

## 2024-12-04 PROBLEM — F51.04 CHRONIC INSOMNIA: Status: ACTIVE | Noted: 2020-05-22

## 2024-12-04 PROBLEM — H16.8 NEUROTROPHIC KERATITIS OF LEFT EYE: Status: ACTIVE | Noted: 2024-12-04

## 2024-12-04 PROBLEM — M85.80 OSTEOPENIA, SENILE: Status: ACTIVE | Noted: 2020-05-22

## 2024-12-04 PROBLEM — N95.2 ATROPHIC VAGINITIS: Status: ACTIVE | Noted: 2020-10-20

## 2024-12-04 PROBLEM — E55.9 VITAMIN D DEFICIENCY: Status: ACTIVE | Noted: 2024-12-04

## 2024-12-04 PROBLEM — M35.00 SICCA SYNDROME (MULTI): Status: ACTIVE | Noted: 2024-12-04

## 2024-12-04 PROBLEM — M47.816 DEGENERATIVE JOINT DISEASE (DJD) OF LUMBAR SPINE: Status: ACTIVE | Noted: 2024-12-04

## 2024-12-04 PROBLEM — H04.122 DRY EYE SYNDROME OF LEFT LACRIMAL GLAND: Status: ACTIVE | Noted: 2024-12-04

## 2024-12-05 ENCOUNTER — APPOINTMENT (OUTPATIENT)
Dept: PRIMARY CARE | Facility: CLINIC | Age: 74
End: 2024-12-05
Payer: MEDICARE

## 2024-12-05 VITALS
DIASTOLIC BLOOD PRESSURE: 80 MMHG | SYSTOLIC BLOOD PRESSURE: 142 MMHG | HEART RATE: 77 BPM | OXYGEN SATURATION: 93 % | WEIGHT: 131.3 LBS | BODY MASS INDEX: 24.79 KG/M2 | HEIGHT: 61 IN | RESPIRATION RATE: 12 BRPM | TEMPERATURE: 98.2 F

## 2024-12-05 DIAGNOSIS — R73.9 HYPERGLYCEMIA: ICD-10-CM

## 2024-12-05 DIAGNOSIS — T88.7XXA MEDICATION SIDE EFFECT: ICD-10-CM

## 2024-12-05 DIAGNOSIS — N95.2 ATROPHIC VAGINITIS: ICD-10-CM

## 2024-12-05 DIAGNOSIS — R60.0 LOCALIZED EDEMA: ICD-10-CM

## 2024-12-05 DIAGNOSIS — L30.8 OTHER ECZEMA: ICD-10-CM

## 2024-12-05 DIAGNOSIS — J98.01 BRONCHOSPASM: ICD-10-CM

## 2024-12-05 DIAGNOSIS — E78.5 HYPERLIPIDEMIA, UNSPECIFIED HYPERLIPIDEMIA TYPE: ICD-10-CM

## 2024-12-05 DIAGNOSIS — Z12.31 SCREENING MAMMOGRAM FOR BREAST CANCER: ICD-10-CM

## 2024-12-05 DIAGNOSIS — Z00.00 ROUTINE GENERAL MEDICAL EXAMINATION AT HEALTH CARE FACILITY: Primary | ICD-10-CM

## 2024-12-05 PROCEDURE — 1159F MED LIST DOCD IN RCRD: CPT | Performed by: FAMILY MEDICINE

## 2024-12-05 PROCEDURE — 3008F BODY MASS INDEX DOCD: CPT | Performed by: FAMILY MEDICINE

## 2024-12-05 PROCEDURE — 99214 OFFICE O/P EST MOD 30 MIN: CPT | Performed by: FAMILY MEDICINE

## 2024-12-05 PROCEDURE — 1123F ACP DISCUSS/DSCN MKR DOCD: CPT | Performed by: FAMILY MEDICINE

## 2024-12-05 PROCEDURE — 1160F RVW MEDS BY RX/DR IN RCRD: CPT | Performed by: FAMILY MEDICINE

## 2024-12-05 PROCEDURE — 1170F FXNL STATUS ASSESSED: CPT | Performed by: FAMILY MEDICINE

## 2024-12-05 PROCEDURE — G0439 PPPS, SUBSEQ VISIT: HCPCS | Performed by: FAMILY MEDICINE

## 2024-12-05 RX ORDER — ALBUTEROL SULFATE 90 UG/1
2 INHALANT RESPIRATORY (INHALATION) EVERY 4 HOURS PRN
Qty: 18 G | Refills: 1 | Status: SHIPPED | OUTPATIENT
Start: 2024-12-05

## 2024-12-05 ASSESSMENT — PATIENT HEALTH QUESTIONNAIRE - PHQ9
1. LITTLE INTEREST OR PLEASURE IN DOING THINGS: NOT AT ALL
SUM OF ALL RESPONSES TO PHQ9 QUESTIONS 1 AND 2: 0
2. FEELING DOWN, DEPRESSED OR HOPELESS: NOT AT ALL

## 2024-12-05 ASSESSMENT — ACTIVITIES OF DAILY LIVING (ADL)
DRESSING: INDEPENDENT
MANAGING_FINANCES: INDEPENDENT
TAKING_MEDICATION: INDEPENDENT
BATHING: INDEPENDENT
GROCERY_SHOPPING: INDEPENDENT
DOING_HOUSEWORK: INDEPENDENT

## 2024-12-05 NOTE — PROGRESS NOTES
"Subjective   Reason for Visit: Mariah Kelly is an 74 y.o. female here for a Medicare Wellness visit.          HPI    Pt here for wellness .  Last CPE 1 year ago with Previous PCP   .   Keratitis / Rot Cuff/ Back, chronic  Recurrent bronchitis / E czema     Interval Health :  good,  multiple appts;  multiple questions today      Interval Changes in PMHx. PSHx, FMHx : ,yes   ENT appts  EYE appts     Concerns/Questions:    Chief Complaint   Patient presents with    Medicare Annual Wellness Visit Subsequent     AMV - pt states she has a list to review.  Pt had flu vaccine 10-14-24     Shaky and dry mouth -  wondering if from meds from ENT  - Astelin, Flonase, Doxycycline,  Prednisone . Is finishing the treatment  . Is on the sprays till January.  Has CT scan in January .    Estrace  cream made  itching worse.   Betamethasone helped her itchy back   Has a hx of stomach ulcer/ stress related ;  avoids nsaids bc of this .    Questions for me about vitamins .  Ankle swelling and rash and hand swelled up .  Was in a hot , humid area   .  Walking a lot as well . Wanted to mention .   Tremor  monica left hand  worse on prednisone .   Other interval  [facility]-shoulder , injected   [place]  , had surgery /   and had hip issues after traveling,  and had hip injection .     Patient Care Team:  mUm Plunkett MD as PCP - General (Family Medicine)  Umm Plunkett MD as PCP - [employer] PCP     Review of Systems  UTD on vision and dental exams   No cp. No palpitations  No recurrence of edema.  It resolved without medical intervention. Made her very nervous.      Objective   Vitals:  /80 (BP Location: Left arm, Patient Position: Sitting, BP Cuff Size: Adult)   Pulse 77   Temp 36.8 °C (98.2 °F) (Temporal)   Resp 12   Ht 1.549 m (5' 1\")   Wt 59.6 kg (131 lb 4.8 oz)   SpO2 93%   BMI 24.81 kg/m²       Physical Exam  Vitals and nursing note reviewed.   Constitutional:       General: She is not in acute distress.     " Appearance: Normal appearance.   Cardiovascular:      Heart sounds: Normal heart sounds.   Pulmonary:      Breath sounds: Normal breath sounds.   Abdominal:      General: Bowel sounds are normal.      Palpations: Abdomen is soft.   Musculoskeletal:         General: Normal range of motion.      Cervical back: Neck supple.   Neurological:      General: No focal deficit present.      Mental Status: She is alert and oriented to person, place, and time.       Slightly shaky in hands   Assessment & Plan  Atrophic vaginitis    Orders:    Follow Up In [facility] - Medicare Annual    Bronchospasm    Orders:    Follow Up In [facility] - PCP; Future    Follow Up In [facility] - PCP - Medicare Annual    albuterol 90 mcg/actuation inhaler; Inhale 2 puffs every 4 hours if needed for wheezing or shortness of breath.    Other eczema    Orders:    Follow Up In [facility] - PCP; Future    Follow Up In [facility] - PCP - Medicare Annual    Routine general medical examination at health care facility    Orders:    1 Year Follow Up In [facility] - PCP - Wellness Exam; Future    Hyperglycemia    Orders:    Follow Up In [facility] - PCP; Future    Basic Metabolic Panel; Future    Hyperlipidemia, unspecified hyperlipidemia type    Orders:    Follow Up In [facility] - PCP; Future    Lipid Panel; Future    Screening mammogram for breast cancer    Orders:    BI mammo bilateral screening tomosynthesis; Future    Localized edema         Medication side effect          Wellness  - preventive care and health maintenance reviewed and discussed   In my opinion, the most helpful vitamins will be :   Vit D /Probiotic/MVI \ /CALCIUM  MAMM  due on /around 1/4/25   DEXA due 1/2026    MED Side EFFECT   Shakiness and dryness(Nasal, oral )    Med side eff- ENT prescribed- recommend she consult the doctor  who prescribed     Vaginitis   Discontinue the Estrace Cream  as it is not helping   Use BetamethasoneCream for the vag itch prn     Eczema  Continue current regimen.     Bronchospasm , intemittent   Inhaler renewed      Orthopedic pain /  ache  Nsaid usewill be okay, .  Pepcid or prilosec to avoid gi irritation ;  minimize use 14 d at a time , of nsaid, will help too      Localized edema, episode in hand , and legs, after days of walking in hot weather  Not cardiac  Reassurance.

## 2024-12-06 PROBLEM — R60.0 LOCALIZED EDEMA: Status: ACTIVE | Noted: 2024-12-06

## 2024-12-06 PROBLEM — E78.5 HYPERLIPIDEMIA: Status: ACTIVE | Noted: 2024-12-06

## 2024-12-06 PROBLEM — J98.01 BRONCHOSPASM: Status: ACTIVE | Noted: 2024-12-06

## 2024-12-06 NOTE — ASSESSMENT & PLAN NOTE
Orders:    Follow Up In [facility] - PCP; Future    Follow Up In [facility] - PCP - Medicare Annual    albuterol 90 mcg/actuation inhaler; Inhale 2 puffs every 4 hours if needed for wheezing or shortness of breath.

## 2024-12-18 ENCOUNTER — HOSPITAL ENCOUNTER (OUTPATIENT)
Dept: RADIOLOGY | Facility: CLINIC | Age: 74
Discharge: HOME | End: 2024-12-18
Payer: MEDICARE

## 2024-12-18 DIAGNOSIS — R44.8 FACIAL PRESSURE: ICD-10-CM

## 2024-12-18 DIAGNOSIS — J32.9 CHRONIC RHINOSINUSITIS: ICD-10-CM

## 2024-12-18 PROCEDURE — 70486 CT MAXILLOFACIAL W/O DYE: CPT

## 2025-01-14 ENCOUNTER — HOSPITAL ENCOUNTER (OUTPATIENT)
Dept: RADIOLOGY | Facility: CLINIC | Age: 75
Discharge: HOME | End: 2025-01-14
Payer: MEDICARE

## 2025-01-14 VITALS — WEIGHT: 128 LBS | HEIGHT: 61 IN | BODY MASS INDEX: 24.17 KG/M2

## 2025-01-14 DIAGNOSIS — Z12.31 SCREENING MAMMOGRAM FOR BREAST CANCER: ICD-10-CM

## 2025-01-14 PROCEDURE — 77067 SCR MAMMO BI INCL CAD: CPT

## 2025-01-14 PROCEDURE — 77063 BREAST TOMOSYNTHESIS BI: CPT | Performed by: RADIOLOGY

## 2025-01-14 PROCEDURE — 77067 SCR MAMMO BI INCL CAD: CPT | Performed by: RADIOLOGY

## 2025-01-28 ENCOUNTER — APPOINTMENT (OUTPATIENT)
Dept: OTOLARYNGOLOGY | Facility: CLINIC | Age: 75
End: 2025-01-28
Payer: MEDICARE

## 2025-01-28 VITALS — HEIGHT: 60 IN | WEIGHT: 128 LBS | BODY MASS INDEX: 25.13 KG/M2

## 2025-01-28 DIAGNOSIS — R09.81 NASAL CONGESTION: ICD-10-CM

## 2025-01-28 DIAGNOSIS — R44.8 FACIAL PRESSURE: ICD-10-CM

## 2025-01-28 DIAGNOSIS — J34.3 HYPERTROPHY OF BOTH INFERIOR NASAL TURBINATES: ICD-10-CM

## 2025-01-28 DIAGNOSIS — J34.2 NASAL SEPTAL DEVIATION: ICD-10-CM

## 2025-01-28 DIAGNOSIS — J32.9 CHRONIC RHINOSINUSITIS: Primary | ICD-10-CM

## 2025-01-28 DIAGNOSIS — J34.89 NASAL OBSTRUCTION: ICD-10-CM

## 2025-01-28 PROCEDURE — 1123F ACP DISCUSS/DSCN MKR DOCD: CPT | Performed by: STUDENT IN AN ORGANIZED HEALTH CARE EDUCATION/TRAINING PROGRAM

## 2025-01-28 PROCEDURE — 1159F MED LIST DOCD IN RCRD: CPT | Performed by: STUDENT IN AN ORGANIZED HEALTH CARE EDUCATION/TRAINING PROGRAM

## 2025-01-28 PROCEDURE — 1036F TOBACCO NON-USER: CPT | Performed by: STUDENT IN AN ORGANIZED HEALTH CARE EDUCATION/TRAINING PROGRAM

## 2025-01-28 PROCEDURE — 3008F BODY MASS INDEX DOCD: CPT | Performed by: STUDENT IN AN ORGANIZED HEALTH CARE EDUCATION/TRAINING PROGRAM

## 2025-01-28 PROCEDURE — 99213 OFFICE O/P EST LOW 20 MIN: CPT | Performed by: STUDENT IN AN ORGANIZED HEALTH CARE EDUCATION/TRAINING PROGRAM

## 2025-01-28 NOTE — PROGRESS NOTES
HPI  1/28/25  The patient presents for follow-up, endorses nasal congestion and occasional facial pressure along the maxillary regions bilaterally.  Stopped medicated nasal spray due to intolerance/eye dryness which she felt was secondary to azelastine use.  CT sinus reviewed and discussed with the patient notable for left maxillary mucous retention cysts and right maxillary mucosal edema.  RAST panel showing allergies to ragweed, Alternaria and Macanese cockroach.  Recall   74-year-old female presenting for initial evaluation of multiple sinonasal complaints.  Main Symptoms: Daily sinonasal symptoms include bilateral nasal congestion/obstruction, anterior and posterior nasal drainage, facial pressure along the maxillary regions bilaterally  Duration: Years  Laterality: Bilateral  Endorses history of environmental allergies  Also reports snoring    Patient denies facial pain, facal numbness, ansomia, dental pain, immunotherapy.    No odynophagia/dysphagia/SOB/dyspnea/hoarseness/fevers/chills/weight loss/night sweats.    Current treatment:  Nasal Steroid: Rhinocort   Sinus Rinse: No  Antihistamine: No  Prednisone: No  Other: None    Recent CT: None    Previous nasal/sinus surgery: None     History reviewed. No pertinent past medical history.     Past Surgical History:   Procedure Laterality Date    HYSTERECTOMY  08/07/2018    Hysterectomy    OTHER SURGICAL HISTORY  08/07/2018    Hand Repair        Current Outpatient Medications   Medication Instructions    acetaminophen (TYLENOL) 500 mg, Every 4 hours PRN    albuterol 90 mcg/actuation inhaler 2 puffs, inhalation, Every 4 hours PRN    azelastine (Astelin) 137 mcg (0.1 %) nasal spray 1 spray, Each Nostril, 2 times daily PRN, Use in each nostril as directed    B complex-vitamin C-folic acid (Nephro-Isabel Rx) 1- mg-mg-mcg tablet 1 tablet, Daily with breakfast    betamethasone dipropionate (Diprosone) 0.05 % lotion Topical, 2 times daily PRN    biotin 5,000 mcg  tablet,disintegrating Take by mouth.    cholecalciferol (Vitamin D-3) 25 MCG (1000 UT) capsule Daily RT    estradiol (Estrace) 0.01 % (0.1 mg/gram) vaginal cream 0.5 gm on external genitalia  and vaginal x 10 days then 3 x a week .    flaxseed oiL 1,000 mg capsule Take by mouth.    fluticasone (Flonase) 50 mcg/actuation nasal spray 1 spray, Each Nostril, 2 times daily, Shake gently. Before first use, prime pump. After use, clean tip and replace cap.    fluticasone furoate-vilanteroL (Breo Ellipta) 100-25 mcg/dose inhaler 1 puff, inhalation, Daily    lactobacillus (Culturelle) 10 billion cell capsule 1 capsule, Daily RT    moxifloxacin (Vigamox) 0.5 % ophthalmic solution 1 drop, 2 times daily    oregano oiL 1,500 mg capsule Daily RT    peg 400-propylene glycol (Systane Ultra) 0.4-0.3 % drops ophthalmic drops 1 drop, 4 times daily PRN    predniSONE (Deltasone) 10 mg tablet TAKE ORALLY: 4 TABLETS DAILY FOR 3 DAYS, 3 TABLETS DAILY FOR 2 DAYS, 2 TABLETS DAILY FOR 2 DAYS AND 1 TABLET DAILY FOR 2 DAYS, THEN STOP.    psyllium (Metamucil) 0.4 gram capsule Daily RT    Restasis 0.05 % ophthalmic emulsion USE ONE DROP INTO THE SURGICAL EYE EVERY 12 HOURS    sod chloride-sodium bicarb (Neilmed Sinus Rinse Complete) nasal rinse Irrigate your nose per instructions twice daily        Allergies   Allergen Reactions    Azithromycin Diarrhea, Other and Unknown     3 day dose pack causes stomach issues but can take 5 day dose    Montelukast Hives and Itching    Levofloxacin Other     Hard time sleeping    Loratadine Other     Will not take because daughters are allergic to claritin    Meperidine (Pf) Swelling        Review of Systems  A detailed 12 point ROS was performed and is negative except as noted in the intake form, HPI and/or Past Medical History    Physical Exam   CONSTITUTIONAL: Well developed, well nourished.  VOICE: Normal voice quality  RESPIRATION: Breathing comfortably, no stridor.  CV: No clubbing/cyanosis/edema in  hands.  EYES: EOM Intact, sclera normal.  NEURO: Alert and oriented times 3, Cranial nerves V,VII intact and symmetric bilaterally.  HEAD AND FACE: Symmetric facial features, no masses or lesions, sinuses nontender to palpation.  SALIVARY GLANDS: Parotid and submandibular glands normal bilaterally.  EARS: Normal external ears, external auditory canals, and TMs to otoscopy  + NOSE: External nose midline, anterior rhinoscopy is normal with limited visualization to the anterior aspect of the interior turbinates. No lesions noted.  Bilateral inferior turbinate hypertrophy  Right septal deviation, left posterior spur  ORAL CAVITY/OROPHARYNX/LIPS: Normal mucous membranes, normal floor of mouth/tongue/OP, no masses or lesions are noted.  PHARYNGEAL WALLS AND NASOPHARYNX: No masses noted. Mucosa appears clean and moist  NECK/LYMPH: No LAD, no thyroid masses. Trachea palpably midline  SKIN: Neck skin is without injury  PSYCH: Alert and oriented with appropriate mood and affect         Assessment  Chronic rhinosinusitis  Facial pressure  Nasal drainage  Nasal airway obstruction  Nasal septal deviation  Bilateral inferior turbinate hypertrophy      Plan  The patient and I discussed their current nasal / sinus symptoms as well as several therapeutic options. I would like to begin a nasal hygiene/ medical regimen consisting of Flonase, azelastine, saline irrigations.  Stop medicated nasal sprays due to concern for eye dryness which she states was triggered by azelastine use  Multiple treatment alternatives discussed, she would like to reattempt medical therapy with Flonase use in addition to saline irrigations.  She will use oral antihistamines as needed for environmental allergies    The patient was instructed on the correct technique to administer the topical nasal spray (s) aiming at the lateral nasal wall for maximal efficacy and to avoid irritation to the septum which could lead to epistaxis. I stressed consistency of usage  for maximal benefit. We discussed the rationale for the timing of this therapy and the benefits and potential adverse effects.      RTC 4m

## 2025-03-18 ENCOUNTER — APPOINTMENT (OUTPATIENT)
Dept: PRIMARY CARE | Facility: CLINIC | Age: 75
End: 2025-03-18
Payer: MEDICARE

## 2025-03-18 VITALS
DIASTOLIC BLOOD PRESSURE: 90 MMHG | HEART RATE: 83 BPM | WEIGHT: 127 LBS | OXYGEN SATURATION: 96 % | BODY MASS INDEX: 24.94 KG/M2 | TEMPERATURE: 97.6 F | HEIGHT: 60 IN | SYSTOLIC BLOOD PRESSURE: 142 MMHG

## 2025-03-18 DIAGNOSIS — H16.8 NEUROTROPHIC KERATITIS OF LEFT EYE: ICD-10-CM

## 2025-03-18 DIAGNOSIS — K21.9 GASTRIC REFLUX: ICD-10-CM

## 2025-03-18 DIAGNOSIS — E78.2 MIXED HYPERLIPIDEMIA: ICD-10-CM

## 2025-03-18 DIAGNOSIS — J34.2 NASAL SEPTAL DEVIATION: ICD-10-CM

## 2025-03-18 DIAGNOSIS — M85.80 OSTEOPENIA, SENILE: ICD-10-CM

## 2025-03-18 DIAGNOSIS — H04.122 DRY EYE SYNDROME OF LEFT LACRIMAL GLAND: ICD-10-CM

## 2025-03-18 DIAGNOSIS — Z87.11 HISTORY OF STOMACH ULCERS: ICD-10-CM

## 2025-03-18 DIAGNOSIS — J98.01 BRONCHOSPASM: ICD-10-CM

## 2025-03-18 DIAGNOSIS — Z76.89 ENCOUNTER TO ESTABLISH CARE: Primary | ICD-10-CM

## 2025-03-18 DIAGNOSIS — M70.62 TROCHANTERIC BURSITIS OF LEFT HIP: ICD-10-CM

## 2025-03-18 DIAGNOSIS — N95.2 ATROPHIC VAGINITIS: ICD-10-CM

## 2025-03-18 DIAGNOSIS — G25.0 ESSENTIAL TREMOR: ICD-10-CM

## 2025-03-18 PROCEDURE — 3008F BODY MASS INDEX DOCD: CPT | Performed by: STUDENT IN AN ORGANIZED HEALTH CARE EDUCATION/TRAINING PROGRAM

## 2025-03-18 PROCEDURE — 1123F ACP DISCUSS/DSCN MKR DOCD: CPT | Performed by: STUDENT IN AN ORGANIZED HEALTH CARE EDUCATION/TRAINING PROGRAM

## 2025-03-18 PROCEDURE — 1160F RVW MEDS BY RX/DR IN RCRD: CPT | Performed by: STUDENT IN AN ORGANIZED HEALTH CARE EDUCATION/TRAINING PROGRAM

## 2025-03-18 PROCEDURE — 99214 OFFICE O/P EST MOD 30 MIN: CPT | Performed by: STUDENT IN AN ORGANIZED HEALTH CARE EDUCATION/TRAINING PROGRAM

## 2025-03-18 PROCEDURE — 1159F MED LIST DOCD IN RCRD: CPT | Performed by: STUDENT IN AN ORGANIZED HEALTH CARE EDUCATION/TRAINING PROGRAM

## 2025-03-18 PROCEDURE — G2211 COMPLEX E/M VISIT ADD ON: HCPCS | Performed by: STUDENT IN AN ORGANIZED HEALTH CARE EDUCATION/TRAINING PROGRAM

## 2025-03-18 RX ORDER — BUTYROSPERMUM PARKII(SHEA BUTTER), SIMMONDSIA CHINENSIS (JOJOBA) SEED OIL, ALOE BARBADENSIS LEAF EXTRACT .01; 1; 3.5 G/100G; G/100G; G/100G
250 LIQUID TOPICAL 2 TIMES DAILY
COMMUNITY

## 2025-03-18 RX ORDER — BETAMETHASONE DIPROPIONATE 0.5 MG/G
CREAM TOPICAL 2 TIMES DAILY
COMMUNITY

## 2025-03-18 ASSESSMENT — ENCOUNTER SYMPTOMS
SHORTNESS OF BREATH: 0
COUGH: 0
LIGHT-HEADEDNESS: 0
FATIGUE: 0
ARTHRALGIAS: 0
FEVER: 0
HEADACHES: 0
CHILLS: 0
MYALGIAS: 0
DIZZINESS: 0
ABDOMINAL PAIN: 0

## 2025-03-18 ASSESSMENT — PATIENT HEALTH QUESTIONNAIRE - PHQ9
SUM OF ALL RESPONSES TO PHQ9 QUESTIONS 1 AND 2: 0
2. FEELING DOWN, DEPRESSED OR HOPELESS: NOT AT ALL
1. LITTLE INTEREST OR PLEASURE IN DOING THINGS: NOT AT ALL

## 2025-03-18 NOTE — ASSESSMENT & PLAN NOTE
Following with ophthalmology. She follows at the [facility]. She has had multiple surgeries in the past. Systane, Moxifloxacin and restasis eye drops.

## 2025-03-18 NOTE — PATIENT INSTRUCTIONS
Thank you for coming in and establishing with me today in the office.    I went ahead and updated some of your medical history within the chart.    Please continue with regular follow-up with your ENT physician discussing more of the nasal septum and you can continue with the Flonase medication for now.  You can also continue with sinus irrigation and making sure you are using regular distilled water.    Please continue with regular follow-up with your ophthalmologist.    If you are having reflux symptoms, you can take some occasional antacid such as Tums or something over-the-counter such as Pepcid to see if that controls her symptoms.    Your blood pressure was elevated today in the office.  I would like you to keep track of your blood pressures at home once or twice a day over the next 4 to 6 weeks.  We will get you set up for a follow-up appointment after that timeframe.  Please bring the log of pressures and your blood pressure cuff with you to that appointment so that we can get it checked for accuracy.    We can also go over any other supplementation if you have other questions at that time.    I would generally avoid ibuprofen especially with your history of stomach ulcers and you can take Tylenol but if you needed to take an occasional ibuprofen you could do that as well.    We can plan on an EKG at the next visit since it does not look like this has been done before in the past.    Please call sooner with any other acute concerns or complaints.    Thank

## 2025-03-18 NOTE — ASSESSMENT & PLAN NOTE
Following with ophthalmology. She follows at the [facility]. She has had multiple surgeries in the past.

## 2025-03-18 NOTE — PROGRESS NOTES
"Subjective   Patient ID: Mariah Kelly is a 74 y.o. female who presents for Establish Care.    HPI     She is here to establish care.  She has a couple of different concerns    She follows with an ENT and she has a deviated nasal septum.  She was put on 3 different nasal sprays and 2 other over the counter medications. She did have side effects.  She was changed down to only taking fluticasone nasal spray.  She has also been doing sinus irrigation.    She has also been having more gastric reflux symptoms.  She is wondering about other things that she can take over-the-counter.    She also wanted to discuss her supplementation as well and whether certain over-the-counter medications might be safe for her to take.  She does not normally keep track of her blood pressures at home.      Review of Systems   Constitutional:  Negative for chills, fatigue and fever.   HENT:  Negative for congestion and postnasal drip.    Respiratory:  Negative for cough and shortness of breath.    Cardiovascular:  Negative for chest pain.   Gastrointestinal:  Negative for abdominal pain.   Musculoskeletal:  Negative for arthralgias and myalgias.   Neurological:  Negative for dizziness, light-headedness and headaches.       Objective   BP (!) 148/102   Pulse 83   Temp 36.4 °C (97.6 °F)   Ht 1.53 m (5' 0.25\")   Wt 57.6 kg (127 lb)   SpO2 96%   BMI 24.60 kg/m²     Physical Exam  Vitals and nursing note reviewed.   Constitutional:       General: She is not in acute distress.     Appearance: Normal appearance. She is normal weight. She is not ill-appearing or toxic-appearing.   HENT:      Head: Normocephalic and atraumatic.   Cardiovascular:      Rate and Rhythm: Normal rate and regular rhythm.      Heart sounds: Normal heart sounds.   Pulmonary:      Effort: Pulmonary effort is normal.      Breath sounds: Normal breath sounds.   Neurological:      Mental Status: She is alert.         Assessment/Plan   Problem List Items Addressed This Visit  "            ICD-10-CM    Osteopenia, senile M85.80     DEXA 1/9/2024 showing osteopenia. Taking calcium and vitamin D. Has done resistance training in the past.         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 She follows at the [facility]. She has had multiple surgeries in the past.         Dry eye syndrome of left lacrimal gland H04.122     Following with ophthalmology. She follows at the [facility]. She has had multiple surgeries in the past. Systane, Moxifloxacin and restasis eye drops.         Atrophic vaginitis N95.2     She was previously tried on estradiol cream that did not seem to help.         Bronchospasm J98.01     Chronic. Stable. On albuterol as needed.         Hyperlipidemia E78.5     Chronic. Stable. Previously discussed medication with her last provider. Will order repeat.         Nasal septal deviation J34.2     Following with ENT with . Currently on Fluticasone propionate nasal spray.         Trochanteric bursitis of left hip M70.62     Chronic. Stable. Following with [facility].         Gastric reflux K21.9    History of stomach ulcers Z87.11    Essential tremor G25.0     Other Visit Diagnoses         Codes    Encounter to establish care    -  Primary Z76.89          History and physical examination as above.  Patient coming in to establish care.  Updated patient's medical history within the chart.  She will continue following with the ENT for the deviated nasal septum.  She will also continue regular follow-up with her ophthalmologist.  Did discuss some additional treatment for gastric reflux symptoms occasionally including Tums or for example over-the-counter Pepcid.  Did discuss that if this continues, we can do a prescription from the office.  Blood pressure elevated at today's appointment.  She will keep track of her blood pressure over the next 4 to 6 weeks and we will plan on a follow-up appointment after this timeframe.  She will  bring in her blood pressure cuff and log of blood pressures with her to that appointment.  Recommended against ibuprofen especially with her history of stomach ulcers but did recommend that she could take Tylenol.  Plan for follow-up in April.  For a blood pressure follow-up.  Plan for Medicare wellness examination later in the fall.

## 2025-03-18 NOTE — ASSESSMENT & PLAN NOTE
DEXA 1/9/2024 showing osteopenia. Taking calcium and vitamin D. Has done resistance training in the past.

## 2025-04-22 ENCOUNTER — APPOINTMENT (OUTPATIENT)
Dept: PRIMARY CARE | Facility: CLINIC | Age: 75
End: 2025-04-22
Payer: MEDICARE

## 2025-04-22 VITALS
HEART RATE: 82 BPM | SYSTOLIC BLOOD PRESSURE: 139 MMHG | BODY MASS INDEX: 24.79 KG/M2 | DIASTOLIC BLOOD PRESSURE: 83 MMHG | TEMPERATURE: 97.8 F | WEIGHT: 128 LBS | OXYGEN SATURATION: 94 %

## 2025-04-22 DIAGNOSIS — R03.0 ELEVATED BLOOD PRESSURE READING: ICD-10-CM

## 2025-04-22 DIAGNOSIS — G25.0 ESSENTIAL TREMOR: Primary | ICD-10-CM

## 2025-04-22 PROCEDURE — 1160F RVW MEDS BY RX/DR IN RCRD: CPT | Performed by: STUDENT IN AN ORGANIZED HEALTH CARE EDUCATION/TRAINING PROGRAM

## 2025-04-22 PROCEDURE — 93000 ELECTROCARDIOGRAM COMPLETE: CPT | Performed by: STUDENT IN AN ORGANIZED HEALTH CARE EDUCATION/TRAINING PROGRAM

## 2025-04-22 PROCEDURE — 1159F MED LIST DOCD IN RCRD: CPT | Performed by: STUDENT IN AN ORGANIZED HEALTH CARE EDUCATION/TRAINING PROGRAM

## 2025-04-22 PROCEDURE — G2211 COMPLEX E/M VISIT ADD ON: HCPCS | Performed by: STUDENT IN AN ORGANIZED HEALTH CARE EDUCATION/TRAINING PROGRAM

## 2025-04-22 PROCEDURE — 1123F ACP DISCUSS/DSCN MKR DOCD: CPT | Performed by: STUDENT IN AN ORGANIZED HEALTH CARE EDUCATION/TRAINING PROGRAM

## 2025-04-22 PROCEDURE — 99214 OFFICE O/P EST MOD 30 MIN: CPT | Performed by: STUDENT IN AN ORGANIZED HEALTH CARE EDUCATION/TRAINING PROGRAM

## 2025-04-22 RX ORDER — PROPRANOLOL HYDROCHLORIDE 60 MG/1
60 CAPSULE, EXTENDED RELEASE ORAL DAILY
Qty: 30 CAPSULE | Refills: 1 | Status: SHIPPED | OUTPATIENT
Start: 2025-04-22 | End: 2025-06-21

## 2025-04-22 NOTE — PROGRESS NOTES
Subjective   Patient ID: Mariah Kelly is a 75 y.o. female who presents for Follow-up.    HPI     Patient coming in for follow-up.  She wanted to discuss her tremor that has been ongoing for some time.  She denies any other concerns or any other symptoms with it.  She has never been tried on medication before.  She has not really kept track of her blood pressures at home but states it has been slightly elevated before in the past.  She also states she has never had an EKG before.    Review of Systems   Constitutional:  Negative for chills, fatigue and fever.   HENT:  Negative for congestion and postnasal drip.    Respiratory:  Negative for cough and shortness of breath.    Cardiovascular:  Negative for chest pain.   Gastrointestinal:  Negative for abdominal pain.   Musculoskeletal:  Negative for arthralgias and myalgias.   Neurological:  Negative for dizziness, light-headedness and headaches.       Objective   /83   Pulse 82   Temp 36.6 °C (97.8 °F)   Wt 58.1 kg (128 lb)   SpO2 94%   BMI 24.79 kg/m²     Physical Exam  Vitals and nursing note reviewed.   Constitutional:       General: She is not in acute distress.     Appearance: Normal appearance. She is normal weight. She is not ill-appearing or toxic-appearing.   HENT:      Head: Normocephalic and atraumatic.   Cardiovascular:      Rate and Rhythm: Normal rate and regular rhythm.      Heart sounds: Normal heart sounds.   Pulmonary:      Effort: Pulmonary effort is normal.      Breath sounds: Normal breath sounds.   Neurological:      Mental Status: She is alert.         Assessment/Plan   Problem List Items Addressed This Visit           ICD-10-CM    Essential tremor - Primary G25.0    Relevant Medications    propranolol LA (Inderal LA) 60 mg 24 hr capsule    Other Relevant Orders    ECG 12 lead (Clinic Performed) (Completed)     Other Visit Diagnoses         Codes      Elevated blood pressure reading     R03.0    Relevant Medications    propranolol LA  (Inderal LA) 60 mg 24 hr capsule    Other Relevant Orders    ECG 12 lead (Clinic Performed) (Completed)          History and physical examination as above.  Patient coming in for follow-up.  Baseline EKG performed in the office which showed sinus rhythm.  No previous available for comparison.  Discussed patient's persistent tremor.  Did discuss that we can try propranolol as this is first-line for essential tremor.  Discussed to keep an eye on her blood pressures as well as sometimes this can affect it.  Did discuss regular wellness care including healthy diet and regular exercise as well as protein intake.  Did discuss more specific exercises for her back as well.  Patient will still keep her scheduled appointment for October.  She will come in sooner with other acute concerns or complaints.

## 2025-04-22 NOTE — PATIENT INSTRUCTIONS
Dr. John Edwards for back exercises.  Big 3 back exercises.    We went ahead and got a baseline EKG which overall shows normal sinus rhythm.  We can continue to keep an eye on this and repeat this again in the future as needed but we do have a good baseline for now.    I went ahead and sent in a prescription for the propranolol medication.  He can take this once a day.  Please continue to keep an eye on blood pressures and see if it starts to help with the tremor.  If you are responding well to the medication, I can send in for additional refills.  Please call the office for refills as needed.  We can also make adjustments to the dosing if needed or changed or different medication if you start having any sort of side effects.    I would deftly recommend overall healthy diet and regular exercise prioritizing plenty of protein intake especially lean proteins as well as vegetable and fruit intake as well.  I always advise regular exercise especially resistance/strength training.  I would recommend that you start off slower to get movement patterns down and also to avoid injury.    We can keep your scheduled appointment in October.  Please call sooner with any other acute concerns or complaints.    Thank you

## 2025-05-05 ASSESSMENT — ENCOUNTER SYMPTOMS
COUGH: 0
CHILLS: 0
SHORTNESS OF BREATH: 0
ARTHRALGIAS: 0
DIZZINESS: 0
FEVER: 0
FATIGUE: 0
HEADACHES: 0
ABDOMINAL PAIN: 0
MYALGIAS: 0
LIGHT-HEADEDNESS: 0

## 2025-05-06 ENCOUNTER — APPOINTMENT (OUTPATIENT)
Dept: OTOLARYNGOLOGY | Facility: CLINIC | Age: 75
End: 2025-05-06
Payer: MEDICARE

## 2025-05-06 VITALS — WEIGHT: 128 LBS | HEIGHT: 60 IN | BODY MASS INDEX: 25.13 KG/M2

## 2025-05-06 DIAGNOSIS — R44.8 FACIAL PRESSURE: ICD-10-CM

## 2025-05-06 DIAGNOSIS — R09.81 NASAL CONGESTION: ICD-10-CM

## 2025-05-06 DIAGNOSIS — J32.9 CHRONIC RHINOSINUSITIS: ICD-10-CM

## 2025-05-06 DIAGNOSIS — J34.89 NASAL DRAINAGE: ICD-10-CM

## 2025-05-06 DIAGNOSIS — Z91.09 ENVIRONMENTAL ALLERGIES: ICD-10-CM

## 2025-05-06 DIAGNOSIS — G47.33 OBSTRUCTIVE SLEEP APNEA: Primary | ICD-10-CM

## 2025-05-06 DIAGNOSIS — J34.2 NASAL SEPTAL DEVIATION: ICD-10-CM

## 2025-05-06 DIAGNOSIS — J34.3 HYPERTROPHY OF BOTH INFERIOR NASAL TURBINATES: ICD-10-CM

## 2025-05-06 PROCEDURE — 1036F TOBACCO NON-USER: CPT | Performed by: STUDENT IN AN ORGANIZED HEALTH CARE EDUCATION/TRAINING PROGRAM

## 2025-05-06 PROCEDURE — 99214 OFFICE O/P EST MOD 30 MIN: CPT | Performed by: STUDENT IN AN ORGANIZED HEALTH CARE EDUCATION/TRAINING PROGRAM

## 2025-05-06 PROCEDURE — 1159F MED LIST DOCD IN RCRD: CPT | Performed by: STUDENT IN AN ORGANIZED HEALTH CARE EDUCATION/TRAINING PROGRAM

## 2025-05-06 NOTE — PROGRESS NOTES
HPI  5/6/25  Reports her sinonasal symptomatology has improved.  Endorses occasional nasal congestion, otherwise no sinonasal complaints.  Today the patient endorses frequent snoring, poor sleep and occasional morning headaches.  Denies apneic events.   Denies dysphagia, odynophagia, neck pain, neck lumps or lumps, fevers, chills, night sweats, voice changes, SOB, weight loss.  1/28/25  The patient presents for follow-up, endorses nasal congestion and occasional facial pressure along the maxillary regions bilaterally.  Stopped medicated nasal spray due to intolerance/eye dryness which she felt was secondary to azelastine use.  CT sinus reviewed and discussed with the patient notable for left maxillary mucous retention cysts and right maxillary mucosal edema.  RAST panel showing allergies to ragweed, Alternaria and Wallisian cockroach.  Recall   74-year-old female presenting for initial evaluation of multiple sinonasal complaints.  Main Symptoms: Daily sinonasal symptoms include bilateral nasal congestion/obstruction, anterior and posterior nasal drainage, facial pressure along the maxillary regions bilaterally  Duration: Years  Laterality: Bilateral  Endorses history of environmental allergies  Also reports snoring    Patient denies facial pain, facal numbness, ansomia, dental pain, immunotherapy.    No odynophagia/dysphagia/SOB/dyspnea/hoarseness/fevers/chills/weight loss/night sweats.    Current treatment:  Nasal Steroid: Rhinocort   Sinus Rinse: No  Antihistamine: No  Prednisone: No  Other: None    Recent CT: None    Previous nasal/sinus surgery: None     Past Medical History:   Diagnosis Date    Bursitis     Peptic ulceration         Past Surgical History:   Procedure Laterality Date    HYSTERECTOMY  08/07/2018    Hysterectomy    OTHER SURGICAL HISTORY  08/07/2018    Hand Repair    ROTATOR CUFF REPAIR Right         Current Outpatient Medications   Medication Instructions    acetaminophen (TYLENOL) 500 mg, Every 4  hours PRN    albuterol 90 mcg/actuation inhaler 2 puffs, inhalation, Every 4 hours PRN    B complex-vitamin C-folic acid (Nephro-Isabel Rx) 1- mg-mg-mcg tablet 1 tablet, Daily with breakfast    betamethasone, augmented, (Diprolene AF) 0.05 % cream 2 times daily    cholecalciferol (Vitamin D-3) 25 MCG (1000 UT) capsule Daily RT    COCONUT OIL ORAL Take by mouth.    estradiol (Estrace) 0.01 % (0.1 mg/gram) vaginal cream 0.5 gm on external genitalia  and vaginal x 10 days then 3 x a week .    flaxseed oiL 1,000 mg capsule Take by mouth.    fluticasone (Flonase) 50 mcg/actuation nasal spray 1 spray, Each Nostril, 2 times daily, Shake gently. Before first use, prime pump. After use, clean tip and replace cap.    fluticasone furoate-vilanteroL (Breo Ellipta) 100-25 mcg/dose inhaler 1 puff, inhalation, Daily    lactobacillus (Culturelle) 10 billion cell capsule 1 capsule, Daily RT    mag hydrox/aluminum hyd/simeth (MAG-AL PLUS ORAL) Take by mouth.    moxifloxacin (Vigamox) 0.5 % ophthalmic solution 1 drop, 2 times daily    mv-mn/FA/K1/resver/lutein/herb (ALIVE ENERGY 50 PLUS ORAL) Take by mouth.    peg 400-propylene glycol (Systane Ultra) 0.4-0.3 % drops ophthalmic drops 1 drop, 4 times daily PRN    predniSONE (Deltasone) 10 mg tablet TAKE ORALLY: 4 TABLETS DAILY FOR 3 DAYS, 3 TABLETS DAILY FOR 2 DAYS, 2 TABLETS DAILY FOR 2 DAYS AND 1 TABLET DAILY FOR 2 DAYS, THEN STOP.    propranolol LA (INDERAL LA) 60 mg, oral, Daily, Do not crush, chew, or split.    psyllium (Metamucil) 0.4 gram capsule Daily RT    Restasis 0.05 % ophthalmic emulsion USE ONE DROP INTO THE SURGICAL EYE EVERY 12 HOURS    saccharomyces boulardii (FLORASTOR) 250 mg, 2 times daily    sod chloride-sodium bicarb (Neilmed Sinus Rinse Complete) nasal rinse Irrigate your nose per instructions twice daily        Allergies   Allergen Reactions    Azithromycin Diarrhea, Other and Unknown     3 day dose pack causes stomach issues but can take 5 day dose     Montelukast Hives and Itching    Levofloxacin Other     Hard time sleeping    Loratadine Other     Will not take because daughters are allergic to claritin    Meperidine (Pf) Swelling        Review of Systems  A detailed 12 point ROS was performed and is negative except as noted in the intake form, HPI and/or Past Medical History    Physical Exam   CONSTITUTIONAL: Well developed, well nourished.  VOICE: Normal voice quality  RESPIRATION: Breathing comfortably, no stridor.  CV: No clubbing/cyanosis/edema in hands.  EYES: EOM Intact, sclera normal.  NEURO: Alert and oriented times 3, Cranial nerves V,VII intact and symmetric bilaterally.  HEAD AND FACE: Symmetric facial features, no masses or lesions, sinuses nontender to palpation.  SALIVARY GLANDS: Parotid and submandibular glands normal bilaterally.  EARS: Normal external ears, external auditory canals, and TMs to otoscopy  + NOSE: External nose midline, anterior rhinoscopy is normal with limited visualization to the anterior aspect of the interior turbinates. No lesions noted.  Bilateral inferior turbinate hypertrophy  Right septal deviation, left posterior spur  ORAL CAVITY/OROPHARYNX/LIPS: Normal mucous membranes, normal floor of mouth/tongue/OP, no masses or lesions are noted.  PHARYNGEAL WALLS AND NASOPHARYNX: No masses noted. Mucosa appears clean and moist  NECK/LYMPH: No LAD, no thyroid masses. Trachea palpably midline  SKIN: Neck skin is without injury  PSYCH: Alert and oriented with appropriate mood and affect         Assessment  Chronic rhinosinusitis  Facial pressure  Nasal drainage  Nasal airway obstruction  Nasal septal deviation  Bilateral inferior turbinate hypertrophy  Possible MAR    Plan  - CRS, CHUCKIE  The patient and I discussed their current nasal / sinus symptoms as well as several therapeutic options.  Reports improvement of her sinonasal symptomatology, she will continue her current nasal hygiene/ medical regimen consisting of Flonase, saline  irrigations.  She will use oral antihistamines as needed for environmental allergies    The patient was instructed on the correct technique to administer the topical nasal spray (s) aiming at the lateral nasal wall for maximal efficacy and to avoid irritation to the septum which could lead to epistaxis. I stressed consistency of usage for maximal benefit. We discussed the rationale for the timing of this therapy and the benefits and potential adverse effects.      -Sleep disordered breathing  Endorses snoring and occasional morning headaches, possible MAR, home sleep test ordered for further evaluation.    RTC 2m

## 2025-05-15 ENCOUNTER — PROCEDURE VISIT (OUTPATIENT)
Dept: SLEEP MEDICINE | Facility: HOSPITAL | Age: 75
End: 2025-05-15
Payer: MEDICARE

## 2025-05-15 ENCOUNTER — TELEPHONE (OUTPATIENT)
Dept: PRIMARY CARE | Facility: CLINIC | Age: 75
End: 2025-05-15
Payer: MEDICARE

## 2025-05-15 DIAGNOSIS — G47.33 OBSTRUCTIVE SLEEP APNEA: ICD-10-CM

## 2025-05-15 PROCEDURE — 95806 SLEEP STUDY UNATT&RESP EFFT: CPT | Performed by: PSYCHIATRY & NEUROLOGY

## 2025-05-15 NOTE — TELEPHONE ENCOUNTER
Pt has been having troubles sleeping taking medication propranolol LA (Inderal LA) 60 mg 24 hr capsule. She would like to talk to you.

## 2025-05-23 ENCOUNTER — OFFICE VISIT (OUTPATIENT)
Dept: PRIMARY CARE | Facility: CLINIC | Age: 75
End: 2025-05-23
Payer: MEDICARE

## 2025-05-23 VITALS
WEIGHT: 131.8 LBS | SYSTOLIC BLOOD PRESSURE: 138 MMHG | TEMPERATURE: 97.7 F | DIASTOLIC BLOOD PRESSURE: 84 MMHG | HEIGHT: 60 IN | BODY MASS INDEX: 25.87 KG/M2 | HEART RATE: 89 BPM | OXYGEN SATURATION: 95 %

## 2025-05-23 DIAGNOSIS — R03.0 ELEVATED BLOOD PRESSURE READING: ICD-10-CM

## 2025-05-23 DIAGNOSIS — G25.0 ESSENTIAL TREMOR: Primary | ICD-10-CM

## 2025-05-23 PROCEDURE — 99214 OFFICE O/P EST MOD 30 MIN: CPT | Performed by: STUDENT IN AN ORGANIZED HEALTH CARE EDUCATION/TRAINING PROGRAM

## 2025-05-23 PROCEDURE — 1159F MED LIST DOCD IN RCRD: CPT | Performed by: STUDENT IN AN ORGANIZED HEALTH CARE EDUCATION/TRAINING PROGRAM

## 2025-05-23 PROCEDURE — 1160F RVW MEDS BY RX/DR IN RCRD: CPT | Performed by: STUDENT IN AN ORGANIZED HEALTH CARE EDUCATION/TRAINING PROGRAM

## 2025-05-23 RX ORDER — PRIMIDONE 50 MG/1
25 TABLET ORAL NIGHTLY
Qty: 30 TABLET | Refills: 0 | Status: SHIPPED | OUTPATIENT
Start: 2025-05-23

## 2025-05-23 RX ORDER — LOSARTAN POTASSIUM 25 MG/1
25 TABLET ORAL DAILY
Qty: 30 TABLET | Refills: 0 | Status: SHIPPED | OUTPATIENT
Start: 2025-05-23 | End: 2025-06-22

## 2025-05-23 ASSESSMENT — ENCOUNTER SYMPTOMS
HEADACHES: 0
MYALGIAS: 0
DIZZINESS: 0
OCCASIONAL FEELINGS OF UNSTEADINESS: 0
ARTHRALGIAS: 0
ABDOMINAL PAIN: 0
DEPRESSION: 0
LIGHT-HEADEDNESS: 0
FATIGUE: 0
SHORTNESS OF BREATH: 0
CHILLS: 0
LOSS OF SENSATION IN FEET: 0
COUGH: 0
FEVER: 0

## 2025-05-23 NOTE — PATIENT INSTRUCTIONS
We will go ahead and try do different prescriptions here today.    I sent in for prescription for primidone and you can start with half a tablet to be taken at night.  Do this for about 2 weeks.  If you are still not getting adequate benefit of the medication, you can then increase to a full tablet for at least another 2 weeks.  Please let me know if we still need to make adjustments to the dose of the medication at that time.    For the blood pressure, we can start a low-dose of a losartan prescription.  You can start with 1 tablet in the morning.  You can also drop to half a tablet if you start noticing your pressure is getting lower.    There is blood work orders to be done on or after 6/5/2025 and you can get these done downstairs through [facility].    After you get the blood work done, please give a call to our office so that I can place orders for both you and your  for your upcoming wellness examinations later in the fall.    Please call sooner with any other acute concerns or complaints.    Thank you

## 2025-05-23 NOTE — PROGRESS NOTES
"Subjective   Patient ID: Mariah Kelly is a 75 y.o. female who presents for Follow-up ( Medication follow up).    HPI     She stated that she was having a lot of issues sleeping.  She stopped the propranolol and this all resolved.    Review of Systems   Constitutional:  Negative for chills, fatigue and fever.   HENT:  Negative for congestion and postnasal drip.    Respiratory:  Negative for cough and shortness of breath.    Cardiovascular:  Negative for chest pain.   Gastrointestinal:  Negative for abdominal pain.   Musculoskeletal:  Negative for arthralgias and myalgias.   Neurological:  Negative for dizziness, light-headedness and headaches.       Objective   /84   Pulse 89   Temp 36.5 °C (97.7 °F) (Oral)   Ht 1.53 m (5' 0.25\")   Wt 59.8 kg (131 lb 12.8 oz)   SpO2 95%   BMI 25.53 kg/m²     Physical Exam  Vitals and nursing note reviewed.   Constitutional:       General: She is not in acute distress.     Appearance: Normal appearance. She is normal weight. She is not ill-appearing or toxic-appearing.   HENT:      Head: Normocephalic and atraumatic.   Cardiovascular:      Rate and Rhythm: Normal rate and regular rhythm.      Heart sounds: Normal heart sounds.   Pulmonary:      Effort: Pulmonary effort is normal.      Breath sounds: Normal breath sounds.   Neurological:      Mental Status: She is alert.         Assessment/Plan   Problem List Items Addressed This Visit           ICD-10-CM    Essential tremor - Primary G25.0    Relevant Medications    primidone (Mysoline) 50 mg tablet     Other Visit Diagnoses         Codes      Elevated blood pressure reading     R03.0    Relevant Medications    losartan (Cozaar) 25 mg tablet               " Codes      Elevated blood pressure reading     R03.0    Relevant Medications    losartan (Cozaar) 25 mg tablet          History and physical examination as above.  Patient taken off of propranolol since she was having side effects.  Did discuss other options for medications for the tremor as well as the blood pressure.  We will start with primidone just with half a tablet to be taken at night for the next 2 weeks.  Did discuss that if she is not getting adequate benefit then she may be able to increase to a full tablet for at least another 2 weeks.  She will call for other medication refills.  Discussed medication risks, benefits and side effects of the medication.  For the blood pressure, we will try low-dose of a losartan prescription and she will start with 1 tablet in the morning.  Did discuss that she can drop to half a tablet depending on the response to her blood pressure if it lowers too much.  Did discuss that she can drop the medication if needed.  She has blood work orders that can be done in the early June.  She will otherwise keep her scheduled appointments for regular wellness care later in the fall.  She will come in sooner with any other acute concerns or complaints.

## 2025-05-27 ENCOUNTER — TELEPHONE (OUTPATIENT)
Dept: PRIMARY CARE | Facility: CLINIC | Age: 75
End: 2025-05-27
Payer: MEDICARE

## 2025-05-27 NOTE — TELEPHONE ENCOUNTER
Pt called in stating that she started taking Primidone on Saturday night and losartan on Sunday morning. Has been light headed, dizzy and super itchy. Noticed on Sunday and this morning. Pt worried about a fall since she has to go up and down stairs at home. Advised her to go to the ER.

## 2025-05-30 ENCOUNTER — TELEPHONE (OUTPATIENT)
Dept: PRIMARY CARE | Facility: CLINIC | Age: 75
End: 2025-05-30
Payer: MEDICARE

## 2025-06-13 ENCOUNTER — APPOINTMENT (OUTPATIENT)
Dept: PRIMARY CARE | Facility: CLINIC | Age: 75
End: 2025-06-13
Payer: MEDICARE

## 2025-06-18 NOTE — PROGRESS NOTES
Patient: Mariah Kelly  : 1950 AGE: 75 y.o. SEX:female   MRN: [id number]   Provider: CHIDI Kaiser     Location [facility]   Service Date: 2025     PCP: Saeed Ruiz DO   Referred by: Jaime Allen MD          [facility]  New Visit Note    Virtual or Telephone Consent  An interactive audio and video telecommunication system which permits real time communications between the patient (at the originating site) and provider (at the distant site) was utilized to provide this telehealth service.   Verbal consent was requested and obtained from Mariah Kelly on this date, 25 for a telehealth visit and the patient's location was confirmed at the time of the visit.     HISTORY OF PRESENT ILLNESS     Mariah Kelly is a 75 y.o. female with a h/o MAR and hyperlipidemia, deviated septum, acid reflux who presents to [facility].    25: NPV with concerns of MAR management, recent sleep study. Tested due to snoring and always tired during the day. Wakes with dry mouth. ----> start APAP via [facility]       SLEEP STUDY HISTORY (personally reviewed raw data such as interpretation report, data sheet, hypnogram, and titration table if available and applicable)  - HSAT 5/15/2025-showing severe MAR with AHI 3% 82.9, AHI 4% 80.2, SpO2 maki 73.8% SpO2 <= 88% for 45.6 minutes    SLEEP-WAKE SCHEDULE    Sleep Patterns: She does have a usual bed partner. In terms of the patient's sleep/wake cycle, she generally gets into bed at approximately 12:30 AM.  her latency to sleep onset after lights out is quick. During the night, the patient generally awakens 1-2+ times nightly. These awakenings are usually brief in duration. Final wake time on weekday mornings is around 10 AM.    Compared to weekdays, the patient's sleep schedule is  similar on the weekends.    Breathing during sleep: snoring and nasal congestion  Behaviors at night: No  "  Sleep paralysis: No   Hypnogogic or hypnopompic hallucinations: No   Cataplexy: No     RLS screen: Patient denies RLS symptoms.    Daytime Symptoms:  On awakening patient reports: wake unrefreshed, feels sleepy, and morning headaches    Sleep environment:  Preferred sleep position: back  Room is dark: Yes  Room is quiet: Yes  Room is cool: Yes  Bed comfort: good    ESS: 6  ARACELIS: 10    REVIEW OF SYSTEMS     All other systems have been reviewed and are negative.    ALLERGIES     Allergies[1]    MEDICATIONS     Current Medications[2]    PAST HISTORIES     PERTINENT PAST MEDICAL HISTORY: See HPI    PERTINENT PAST SURGICAL HISTORY for Sleep Medicine:  non-contributory    PERTINENT FAMILY HISTORY for Sleep Medicine:  sleep apnea- sister, uncles     PERTINENT SOCIAL HISTORY:  She  reports that she has never smoked. She has never been exposed to tobacco smoke. She has never used smokeless tobacco. She reports current alcohol use. She reports that she does not use drugs.     Active Problems, Allergy List, Medication List, and PMH/PSH/FH/Social Hx have been reviewed and reconciled in chart. No significant changes unless documented in the pertinent chart section. Updates made when necessary.     PHYSICAL EXAM     VITAL SIGNS: There were no vitals taken for this visit.    CURRENT WEIGHT: There were no vitals filed for this visit.     PREVIOUS WEIGHTS:  Wt Readings from Last 3 Encounters:   06/19/25 60 kg (132 lb 3.2 oz)   05/23/25 59.8 kg (131 lb 12.8 oz)   05/06/25 58.1 kg (128 lb)     Limited telehealth examination  PHYSICAL EXAMINATION  General appearance: awake alert in NAD  Affect: normal  HEENT: Nasal congestion absent  Lungs: no cough.  Neuro: normal speech      RESULTS/DATA     No results found for: \"IRON\", \"TRANSFERRIN\", \"IRONSAT\", \"TIBC\", \"FERRITIN\"    Bicarbonate   Date Value Ref Range Status   11/25/2024 27 21 - 32 mmol/L Final       ASSESSMENT/PLAN     Ms. Kelly is a 75 y.o. female and She was referred to the " [facility] for evaluation of MAR    Problem List, Orders, Assessment, Recommendations:    # MAR, severe  - HSAT 5/15/2025-showing severe MAR with AHI 3% 82.9, AHI 4% 80.2, SpO2 maki 73.8% SpO2 <= 88% for 45.6 minutes  - Personally reviewed the sleep study's raw data such as interpretation report, data sheet, and hypnogram. Discussed sleep study results with patient today.  - Will start APAP 5-15 cwp via D[facility]- [facility]  - Sleep apnea, PAP therapy education as well as the tips to be successful with PAP treatment was provided at length in clinic today. Patient verbalized understanding  - Discussed 30-day mask guarantee and insurance requirement regarding PAP compliance and follow-up.   - Diet, exercise, and weight loss were emphasized today in clinic, as were non-supine sleep, avoiding alcohol in the late evening, and driving or operating heavy machinery when sleepy    # ALLERGIC RHINITIS/SEASONAL ALLERGIES/NASAL CONGESTION/Deviated Septum   - Continue fluticasone nasal spray, may increase to 2 sprays per nostril once daily 1 hour before bedtime.  - Educated patient on proper use of intranasal sprays.       #Overweight   BMI Readings from Last 1 Encounters:   06/19/25 25.60 kg/m²     - Encouraged healthy weight loss via diet and exercise  - Weight loss can help in the long term treatment of MAR.  - Defer management to PCP     All of patient's questions were answered. She verbalizes understanding and agreement with my assessment and plan.    Disposition    Follow up 31-90 days after starting PAP therapy           [1]   Allergies  Allergen Reactions    Azithromycin Diarrhea, Other and Unknown     3 day dose pack causes stomach issues but can take 5 day dose    Montelukast Hives and Itching    Levofloxacin Other     Hard time sleeping    Loratadine Other     Will not take because daughters are allergic to claritin    Meperidine (Pf) Swelling   [2]   Current Outpatient Medications   Medication  Sig Dispense Refill    acetaminophen (Tylenol) 500 mg tablet Take 1 tablet (500 mg) by mouth every 4 hours if needed.      albuterol 90 mcg/actuation inhaler Inhale 2 puffs every 4 hours if needed for wheezing or shortness of breath. 18 g 1    B complex-vitamin C-folic acid (Nephro-Isabel Rx) 1- mg-mg-mcg tablet Take 1 tablet by mouth once daily with breakfast.      betamethasone, augmented, (Diprolene AF) 0.05 % cream Apply topically 2 times a day.      cholecalciferol (Vitamin D-3) 25 MCG (1000 UT) capsule Take by mouth once daily.      COCONUT OIL ORAL Take by mouth.      flaxseed oiL 1,000 mg capsule Take by mouth.      fluticasone (Flonase) 50 mcg/actuation nasal spray Administer 1 spray into each nostril 2 times a day. Shake gently. Before first use, prime pump. After use, clean tip and replace cap. 48 g 3    fluticasone furoate-vilanteroL (Breo Ellipta) 100-25 mcg/dose inhaler Inhale 1 puff once daily. 28 each 1    lactobacillus (Culturelle) 10 billion cell capsule Take 1 capsule by mouth once daily.      mag hydrox/aluminum hyd/simeth (MAG-AL PLUS ORAL) Take by mouth.      moxifloxacin (Vigamox) 0.5 % ophthalmic solution Administer 1 drop into the left eye 2 times a day.      mv-mn/FA/K1/resver/lutein/herb (ALIVE ENERGY 50 PLUS ORAL) Take by mouth.      peg 400-propylene glycol (Systane Ultra) 0.4-0.3 % drops ophthalmic drops Administer 1 drop into affected eye(s) 4 times a day as needed.      primidone (Mysoline) 50 mg tablet Take 1 tablet (50 mg) by mouth once daily at bedtime. 30 tablet 0    psyllium (Metamucil) 0.4 gram capsule Take by mouth once daily.      Restasis 0.05 % ophthalmic emulsion USE ONE DROP INTO THE SURGICAL EYE EVERY 12 HOURS      saccharomyces boulardii (Florastor) 250 mg capsule Take 1 capsule (250 mg) by mouth 2 times a day.      sod chloride-sodium bicarb (Neilmed Sinus Rinse Complete) nasal rinse Irrigate your nose per instructions twice daily 1 each 3    estradiol (Estrace)  0.01 % (0.1 mg/gram) vaginal cream 0.5 gm on external genitalia  and vaginal x 10 days then 3 x a week . 42.5 g 1    predniSONE (Deltasone) 10 mg tablet TAKE ORALLY: 4 TABLETS DAILY FOR 3 DAYS, 3 TABLETS DAILY FOR 2 DAYS, 2 TABLETS DAILY FOR 2 DAYS AND 1 TABLET DAILY FOR 2 DAYS, THEN STOP. 24 tablet 0     No current facility-administered medications for this visit.

## 2025-06-19 ENCOUNTER — APPOINTMENT (OUTPATIENT)
Dept: PRIMARY CARE | Facility: CLINIC | Age: 75
End: 2025-06-19
Payer: MEDICARE

## 2025-06-19 ENCOUNTER — OFFICE VISIT (OUTPATIENT)
Dept: PRIMARY CARE | Facility: CLINIC | Age: 75
End: 2025-06-19
Payer: MEDICARE

## 2025-06-19 VITALS
HEART RATE: 83 BPM | SYSTOLIC BLOOD PRESSURE: 132 MMHG | DIASTOLIC BLOOD PRESSURE: 70 MMHG | WEIGHT: 132.2 LBS | BODY MASS INDEX: 25.6 KG/M2

## 2025-06-19 DIAGNOSIS — M70.62 TROCHANTERIC BURSITIS OF LEFT HIP: ICD-10-CM

## 2025-06-19 DIAGNOSIS — R55 SYNCOPE, UNSPECIFIED SYNCOPE TYPE: ICD-10-CM

## 2025-06-19 DIAGNOSIS — G25.0 ESSENTIAL TREMOR: Primary | ICD-10-CM

## 2025-06-19 DIAGNOSIS — R03.0 ELEVATED BLOOD PRESSURE READING: ICD-10-CM

## 2025-06-19 PROCEDURE — G2211 COMPLEX E/M VISIT ADD ON: HCPCS | Performed by: STUDENT IN AN ORGANIZED HEALTH CARE EDUCATION/TRAINING PROGRAM

## 2025-06-19 PROCEDURE — 1159F MED LIST DOCD IN RCRD: CPT | Performed by: STUDENT IN AN ORGANIZED HEALTH CARE EDUCATION/TRAINING PROGRAM

## 2025-06-19 PROCEDURE — 1036F TOBACCO NON-USER: CPT | Performed by: STUDENT IN AN ORGANIZED HEALTH CARE EDUCATION/TRAINING PROGRAM

## 2025-06-19 PROCEDURE — 99214 OFFICE O/P EST MOD 30 MIN: CPT | Performed by: STUDENT IN AN ORGANIZED HEALTH CARE EDUCATION/TRAINING PROGRAM

## 2025-06-19 PROCEDURE — 1160F RVW MEDS BY RX/DR IN RCRD: CPT | Performed by: STUDENT IN AN ORGANIZED HEALTH CARE EDUCATION/TRAINING PROGRAM

## 2025-06-19 RX ORDER — PRIMIDONE 50 MG/1
50 TABLET ORAL NIGHTLY
Qty: 30 TABLET | Refills: 0 | Status: SHIPPED | OUTPATIENT
Start: 2025-06-19

## 2025-06-19 ASSESSMENT — ENCOUNTER SYMPTOMS
DIZZINESS: 0
SHORTNESS OF BREATH: 0
VOMITING: 0
CHILLS: 0
FREQUENCY: 0
HEADACHES: 0
LIGHT-HEADEDNESS: 0
COUGH: 0
DYSURIA: 0
ABDOMINAL PAIN: 0
CONSTIPATION: 0
NAUSEA: 0
FATIGUE: 0
FEVER: 0
RHINORRHEA: 0
DIARRHEA: 0
OCCASIONAL FEELINGS OF UNSTEADINESS: 0
LOSS OF SENSATION IN FEET: 0
DEPRESSION: 0

## 2025-06-19 NOTE — PATIENT INSTRUCTIONS
Dr. John Edwards   Back [facility]    I went ahead and submitted for an echocardiogram for your heart.  Please let us know if you do not receive a call back from cardiology within the next week or so so that we can follow-up on that referral.    You can go ahead and start trying a full tablet of the primidone to see if that helps more with your tremors.  If you notice any other side effects, please just drop it back down to half a tablet.    Just continue to keep an eye on your blood pressures at home but overall you are decently controlled here today and it looks like some of the numbers at home have been ranging.  I will hold off on treatment for now to avoid any other interaction with side effects.  We can also revisit this again in the future if your levels are persistently elevated.    Please call with any other questions or concerns.    Thank you

## 2025-06-19 NOTE — PROGRESS NOTES
Subjective   Patient ID: Mariah Kelly is a 75 y.o. female who presents for Med Refill.    Med Refill  Pertinent negatives include no abdominal pain, chest pain, chills, congestion, coughing, fatigue, fever, headaches, nausea or vomiting.        She has been doing fine on the primidone.  She still notices some of the tremor but overall improved.  She is interested in possibly increasing up to a full tablet.    She is following with her orthopedics and they are concerned about a possible high hamstring tendon tear. They are planning on [place] [facility] [place].    She had an episode of syncope in December 2024.  She was not seen for it at the time.  She was going down the stairs and was on the last step when she had came to after passing out.  She does not think that she was out for very long.  She did not see any provider and was not seen in the emergency department for this at the time.  She did not have any previous workup.  She denies any current symptoms such as lightheadedness, dizziness or headaches.    Review of Systems   Constitutional:  Negative for chills, fatigue and fever.   HENT:  Negative for congestion and rhinorrhea.    Respiratory:  Negative for cough and shortness of breath.    Cardiovascular:  Negative for chest pain.   Gastrointestinal:  Negative for abdominal pain, constipation, diarrhea, nausea and vomiting.   Genitourinary:  Negative for dysuria and frequency.   Neurological:  Negative for dizziness, light-headedness and headaches.       Objective   /70 (BP Location: Left arm, Patient Position: Sitting)   Pulse 83   Wt 60 kg (132 lb 3.2 oz)   BMI 25.60 kg/m²     Physical Exam  Vitals and nursing note reviewed.   Constitutional:       General: She is not in acute distress.     Appearance: Normal appearance. She is normal weight. She is not ill-appearing or toxic-appearing.   HENT:      Head: Normocephalic and atraumatic.   Cardiovascular:      Rate and Rhythm: Normal rate and regular rhythm.       Heart sounds: Normal heart sounds.   Pulmonary:      Effort: Pulmonary effort is normal.      Breath sounds: Normal breath sounds.   Neurological:      Mental Status: She is alert.         Assessment/Plan   Problem List Items Addressed This Visit           ICD-10-CM    Trochanteric bursitis of left hip M70.62    Chronic. Stable. Following with [facility].         Essential tremor - Primary G25.0    Elevated blood pressure reading R03.0    Relevant Orders    Transthoracic Echo (TTE) Complete     Other Visit Diagnoses         Codes      Syncope, unspecified syncope type     R55    Relevant Orders    Transthoracic Echo (TTE) Complete          History and physical examination as above.  Patient coming in for follow-up for medication.  She states that the primidone alone has been doing well.  We will go ahead and increase this up to a full tablet since she is still having some tremor.  We did discuss that she can reduce it back down to half a tablet if she notices any side effects.  We will hold off on any management for the blood pressure at this point since she is overall well-controlled for the most part.  She will continue to keep an eye on at home and we can decide to add something in the future.  She is following with orthopedics for her left posterior hip and they are planning on doing a ultrasound musculoskeletal.  Patient did have an episode of syncope in December 2020 for that she never had this evaluated at that point.  She denies any other symptoms since that time.  EKG has been normal in the office without any acute findings.  Echocardiogram ordered for the patient.  We will plan on continued follow-up.  She will call sooner with any other acute concerns or complaints.

## 2025-06-22 PROBLEM — G47.33 OSA (OBSTRUCTIVE SLEEP APNEA): Status: ACTIVE | Noted: 2025-06-22

## 2025-06-24 ENCOUNTER — APPOINTMENT (OUTPATIENT)
Facility: CLINIC | Age: 75
End: 2025-06-24
Payer: MEDICARE

## 2025-06-24 DIAGNOSIS — G47.33 OBSTRUCTIVE SLEEP APNEA: Primary | ICD-10-CM

## 2025-06-24 DIAGNOSIS — E66.3 OVERWEIGHT (BMI 25.0-29.9): ICD-10-CM

## 2025-06-24 DIAGNOSIS — J34.2 NASAL SEPTAL DEVIATION: ICD-10-CM

## 2025-06-24 PROCEDURE — G8433 SCR FOR DEP NOT CPT DOC RSN: HCPCS | Performed by: NURSE PRACTITIONER

## 2025-06-24 PROCEDURE — G2211 COMPLEX E/M VISIT ADD ON: HCPCS | Performed by: NURSE PRACTITIONER

## 2025-06-24 PROCEDURE — 99204 OFFICE O/P NEW MOD 45 MIN: CPT | Performed by: NURSE PRACTITIONER

## 2025-06-24 PROCEDURE — 1160F RVW MEDS BY RX/DR IN RCRD: CPT | Performed by: NURSE PRACTITIONER

## 2025-06-24 PROCEDURE — 1159F MED LIST DOCD IN RCRD: CPT | Performed by: NURSE PRACTITIONER

## 2025-06-24 ASSESSMENT — SLEEP AND FATIGUE QUESTIONNAIRES
SLEEP_PROBLEM_INTERFERES_DAILY_ACTIVITIES: SOMEWHAT
HOW LIKELY ARE YOU TO NOD OFF OR FALL ASLEEP WHILE WATCHING TV: MODERATE CHANCE OF DOZING
HOW LIKELY ARE YOU TO NOD OFF OR FALL ASLEEP WHILE SITTING AND READING: MODERATE CHANCE OF DOZING
DIFFICULTY_FALLING_ASLEEP: MODERATE
HOW LIKELY ARE YOU TO NOD OFF OR FALL ASLEEP WHILE SITTING QUIETLY AFTER LUNCH WITHOUT ALCOHOL: SLIGHT CHANCE OF DOZING
HOW LIKELY ARE YOU TO NOD OFF OR FALL ASLEEP IN A CAR, WHILE STOPPED FOR A FEW MINUTES IN TRAFFIC: WOULD NEVER DOZE
ESS-CHAD TOTAL SCORE: 6
SITING INACTIVE IN A PUBLIC PLACE LIKE A CLASS ROOM OR A MOVIE THEATER: WOULD NEVER DOZE
HOW LIKELY ARE YOU TO NOD OFF OR FALL ASLEEP WHILE LYING DOWN TO REST IN THE AFTERNOON WHEN CIRCUMSTANCES PERMIT: WOULD NEVER DOZE
SATISFACTION_WITH_CURRENT_SLEEP_PATTERN: DISSATISFIED
DIFFICULTY_STAYING_ASLEEP: MILD
SLEEP_PROBLEM_NOTICEABLE_TO_OTHERS: A LITTLE
HOW LIKELY ARE YOU TO NOD OFF OR FALL ASLEEP WHEN YOU ARE A PASSENGER IN A CAR FOR AN HOUR WITHOUT A BREAK: SLIGHT CHANCE OF DOZING
HOW LIKELY ARE YOU TO NOD OFF OR FALL ASLEEP WHILE SITTING AND TALKING TO SOMEONE: WOULD NEVER DOZE
WORRIED_DISTRESSED_DUE_TO_SLEEP: A LITTLE

## 2025-06-24 ASSESSMENT — COLUMBIA-SUICIDE SEVERITY RATING SCALE - C-SSRS
2. HAVE YOU ACTUALLY HAD ANY THOUGHTS OF KILLING YOURSELF?: NO
1. IN THE PAST MONTH, HAVE YOU WISHED YOU WERE DEAD OR WISHED YOU COULD GO TO SLEEP AND NOT WAKE UP?: NO
6. HAVE YOU EVER DONE ANYTHING, STARTED TO DO ANYTHING, OR PREPARED TO DO ANYTHING TO END YOUR LIFE?: NO

## 2025-06-24 ASSESSMENT — ENCOUNTER SYMPTOMS
LOSS OF SENSATION IN FEET: 0
OCCASIONAL FEELINGS OF UNSTEADINESS: 0
DEPRESSION: 0

## 2025-06-24 NOTE — PATIENT INSTRUCTIONS
[facility]  DO [address] [place] [address] [place] [address]       NAME: Mariah Kelly   DATE: 06/24/25    Your Sleep Provider Today: CHIDI Kaiser  Your Primary Care Physician: Saeed Ruiz DO       DIAGNOSIS:   1. MAR (obstructive sleep apnea)        2. Obstructive sleep apnea  Referral to Adult Sleep Medicine          Thank you for coming to the Sleep Medicine Clinic today! Your sleep medicine provider today was: CHIDI Kaiser Below is a summary of your treatment plan, other important information, and our contact numbers:      TREATMENT PLAN     - Follow-up in 3 months.  - If not already done, sign up for 'My Chart' and send prescription requests or messages through this    Obstructive sleep apnea (MRA): MAR is a sleep disorder where your upper airway muscles relax during sleep and the airway intermittently and repetitively narrows and collapses leading to blocked airway (apnea) which, in turn, can disrupt breathing in sleep, lower oxygen levels while you sleep and cause night time wakings. Because apnea may cause higher carbon dioxide or low oxygen levels, untreated MAR can lead to heart arrhythmia, elevation of blood pressure, and make it harder for the body to consolidate memory and metabolize (leading to higher blood sugars at night).   Frequent partial arousals occur during sleep resulting in sleep deprivation and daytime sleepiness. MAR is associated with an increased risk of cardiovascular disease, stroke, hypertension, and insulin resistance. Moreover, untreated MAR with excessive daytime sleepiness can increase the risk of motor vehicular accidents.    Some conservative strategies for MAR are:   Positional therapy - Avoid sleeping on your back.   Healthy diet, exercise, and optimizing weight encouraged.   Avoid alcohol late in the evening as it can make sleep apnea worse.     Safety: Avoid driving and  "operating heavy equipment while sleepy. Drowsy driving may lead to life-threatening motor vehicle accidents.     Common treatment options for sleep apnea include weight management, positional therapy, Positive Airway Therapy (PAP) therapy, oral appliance therapy, hypoglossal nerve stimulation, and select airway surgeries.    Starting Positive Airway Pressure: You were ordered a device to wear when you sleep called PAP (Positive Airway Pressure) to treat your sleep apnea. The order will be submitted to a durable medical equipment company who will arrange setting you up with the device. They will provide all the necessary equipment and discuss use and maintenance of the device with you.     **Please bring all PAP equipment with you to follow up appointments unless told otherwise.**           Important things to keep in mind as you start PAP    Insurance will monitor your usage during the first 90 days.  You should use your PAP - \"all night, every night\", for your health. The bare minimum is to use your PAP device while sleeping = at least 4 hours per day at least 5 days per week. Otherwise, your PAP device may be reclaimed by your PAP vendor at 90 days.  There are many mask to choose from to wear with your PAP machine. If you are not comfortable with the first mask issued to you, call your DME and ask for another option to try (within the first 30 days).  Discuss with your provider if you are having issues breathing with the machine or the temperature or humidity feel uncomfortable.  Expect to have an adjustment period when you start your device. It helps to continuing wearing the machine every day for a period of time until you get more used to it. You can practice with wearing the mask alone if you need, then add in the PAP air pressure a few days later.   Reach out for help if you are struggling! The sleep medicine department can be reached at [phone]  We encourage you to download data monitoring apps to your " phone. For Touchstorm 10/11 - MyAir liza. For Nimbus Discovery - DreamMapper. Both are available in the Liza store for free and are a great tool to monitor your progress with your CPAP night to night.    IF YOU ARE HAVING TROUBLE GETTING USED TO YOUR PAP DEVICE    The following steps are recommended to help you get accustomed to using CPAP and improve your CPAP usage at home:    Use CPAP every night for 5 to 10 minutes while awake in the evening without fail.  Be sure to remain awake while breathing on the nasal mask for the first 1 to 2 weeks.  After 2 to 4 weeks, start using CPAP at night when you go to sleep…But be sure to take the mask off if you have not fallen asleep in 5 to 10 minutes, or if you fall asleep.  Take the mask off whenever you become aware of it or the moment you wake up.   Continue this process every night, with confidence that you will gradually become accustomed to using CPAP over time.    Be sure you are using a comfortable mask, and that you are applying it snugly (but not too tight).    Common issues with PAP machine:  Mask gets dislodged when turning to the side: Consider getting a CPAP pillow or switching to a mask with hose on top.     Dry mouth:  Your machine has built-in humidifier that heats up the air to prevent dry mouth. It can be adjusted to your comfort. You can try that first and increase setting one level one night at a time to check which setting is comfortable and effective in lessening dry mouth. If dry mouth persists despite humidity setting adjustment, may apply OTC Biotene gel over the gums at bedtime.  If Biotene gel is not effective, consider trying XEROSTOM gel from [employer].  If using nasal pillows or nasal mask, consider adding chinstrap or mouth tape to keep your mouth closed while you sleep. Also, eliminate or reduce dose of meds that can cause dry mouth if possible. Lastly, may try getting a separate room humidifier machine.    Airleaks: Please call DME  "as they may need to adjust your mask or refit you with a different kind of mask. In addition, you can ask DME for tips on getting a good mask seal and mask fit.     Difficulty tolerating the mask: Contact your DME to try a different kind of mask and/or call office to get a referral to Sleep Psychologist for CPAP desensitization. CPAP desensitization technique is a set of strategies that helps patient cope with claustrophobia and anxiety related to wearing mask. Alternatively, we can do a daytime mini-sleep study called PAP-nap trial wherein you will try on different kinds of mask and the sleep technician will try different pressure settings on CPAP and BPAP machines to see which specific pressure is tolerable and comfortable for you.     Water droplets or moisture within the hose and/or mask: This is called rain-out and it is caused by condensation of too much heated humidity on the cooler walls of the hose. If you have rain-out, turn down/decrease your humidity setting or get a heated hose. If you already have a heated hose, turn up the \"tube temperature\" of the heated hose. Alternatively, if you don't want to get a heated hose or warmer air, may wrap the CPAP hose with stockings to keep it somewhat warm. Also, you need to place the machine on the floor and lower the hose so that water won't travel upward towards your mask.     Maintaining your CPAP/BPAP device:    The humidification chamber (aka water tank or water chamber) needs to be filled with distilled water to prevent buildup of white deposits in the future. If you cannot find distilled water, you can use tap water but expect to have white deposits buildup seen after prolonged use with tap water. If you start seeing white deposits on the water chamber, you can clean it by filling it with equal parts of distilled white vinegar and water. Let the vinegar-water mixture sit for 2 hours, and then rinse it with running tap water. Clean with soap and water then let " it dry.     You should try to keep your machine clean in order to work well. Here are some tips to clean PAP supplies / accessories:    Clean the humidification chamber (aka water tank) as well as your mask and tubing at least once a week with soap and water.   Alternatively, you can fill a sink or basin with warm water and add a little mild detergent, like Ivory dish soap. Gently wipe your supplies with the soapy water to free all the oils and dirt that may have collected. Once that's done, rinse these items with clean water until the soap is gone and let them air dry. You can hang your tubing over the curtain heather in your bathroom so that it dries.  The mask insert (part of the mask that has contact with your skin) needs to be cleaned with soap and water daily. Another option is to wipe them down with CPAP wipes or baby wipes.    You should replace your mask and tubing frequently in order to prevent bacteria buildup, machine damage, and mask seal issues. The older the mask and hose, the high likelihood that there is bacteria buildup in it especially if they are not cleaned regularly. Dirty filters damage machines because build-up of dust and contaminants can cause machine to over-heat, and in time, damage the motor of machine. Cushions lose their seal over time as most masks are made of plastic and silicone while headgear is made of neoprene. These materials will break down with age and frequent use. Here is the recommended replacement schedule for PAP supplies / accessories:    Twice a month- disposable filters and cushions for nasal mask or nasal pillows.  Once a month- cushion for full face mask  Every 3 months- mask with headgear and PAP tubing (standard or heated hose)  Every 6 months- reusable filter, water chamber, and chin strap     Other useful information:    Some people do not put water in the tank while other people prefers to put water in the tank to prevent mouth dryness. Try to experiment to  determine which is more comfortable for you.   In general, new machines have 2 years warranty on parts while health insurance allows you to have a new machine once every 5 years.     You can also go to the following EDUCATION WEBSITES for further information:   American Academy of Sleep Medicine http://sleepeducation.org  National Sleep Foundation: https://sleepfoundation.org  American Sleep Apnea Association: https://www.sleepapnea.org (for patients with sleep apnea)    Here at [facility], we wish you a restful sleep!       IMPORTANT INFORMATION     Call 911 for medical emergencies.  Our offices are generally open from Monday-Friday, 9 am - 5 pm.  If you need to get in touch with me, you may either call me/my team (number is below) or you can use [employer].  If a referral for a test, for CPAP, or for another specialist was made, and you have not heard about scheduling this within a week, please call scheduling at [phone] ([phone]).  If you are unable to make your appointment for clinic or an overnight study, kindly call the office at least 48 hours in advance to cancel and reschedule.  If you are on CPAP, please bring your device's card or the device to each clinic appointment.   There are no supporting services by either the sleep doctors or their staff on weekends and Holidays, or after 5 PM on weekdays.     PRESCRIPTIONS     We require 7 days advanced notice for prescription refills. If we do not receive the request in this time, we cannot guarantee that your medication will be refilled in time.    IMPORTANT PHONE NUMBERS     [facility]: [phone]  "[employer] ([employer]): [phone]  [employer] ([employer]): [phone]  [facility] (DME): [phone]    CONTACTING YOUR SLEEP MEDICINE PROVIDER AND SLEEP TEAM      For issues with your machine or mask interface, please call your DME provider first. [employer] stands for durable medical company. [employer] is the company who  "provides you the machine and/or PAP supplies / accessories.   To schedule, cancel, or reschedule SLEEP STUDY APPOINTMENTS, please call the Main Phone Line at [phone] - option 3.   To schedule, cancel, or reschedule CLINIC APPOINTMENTS, you can do it in \"MyChart\", call [phone] for [facility] office to speak with my on site staff, or call the Main Phone Line at [phone] - option 2  For CLINICAL QUESTIONS or MEDICATION REFILLS, please call direct line for Adult Sleep Nurses at [phone].   Lastly, you can also send a message directly to your provider through \"My Chart\", which is the email service through your  Records Account: [email or web address]     Adult Sleep Nurses (Sara Aiken, ALEXA and Aida Malik RN):  For clinical questions and refilling prescriptions: [phone]  Email sleep diaries and other documents at: [email or web address]    Office locations for Valerie Tolliver NP:    [facility]   [address]   [address]  [place] [address] [phone]    [facility]  [address]  [address]  [place] [address] [phone]    OUR SLEEP TESTING LOCATIONS     Our team will contact you to schedule your sleep study, however, you can contact us as follow:  Main Phone Line (scheduling only): [phone]), option 3    Sleep Testing Locations:   [place] (18 years and older): [address] [place] [address], 2nd floor  [facility] (18 years and older): [address]; 4th floor  After hours line: [phone]  [facility] (18 years and older) at [place]: [id number] [place] [address]  After hours line: [phone]   [facility] (Main campus: All ages): [facility], 6th floor. After hours line: [phone]   [place] (5 years and older; younger considered on case-by-case basis): [address]; [employer] [address], [address]. Scheduling  After hours line: [phone]       Here " at [facility], we wish you a restful sleep!    Your sleep medicine provider for this visit was: JULIÁN Kaiser-CNP

## 2025-06-29 PROBLEM — E66.3 OVERWEIGHT (BMI 25.0-29.9): Status: ACTIVE | Noted: 2025-06-29

## 2025-07-01 ENCOUNTER — APPOINTMENT (OUTPATIENT)
Dept: OTOLARYNGOLOGY | Facility: CLINIC | Age: 75
End: 2025-07-01
Payer: MEDICARE

## 2025-07-07 ENCOUNTER — HOSPITAL ENCOUNTER (OUTPATIENT)
Dept: CARDIOLOGY | Facility: CLINIC | Age: 75
Discharge: HOME | End: 2025-07-07
Payer: MEDICARE

## 2025-07-07 DIAGNOSIS — R03.0 ELEVATED BLOOD PRESSURE READING: ICD-10-CM

## 2025-07-07 DIAGNOSIS — R55 SYNCOPE, UNSPECIFIED SYNCOPE TYPE: ICD-10-CM

## 2025-07-07 LAB
AORTIC VALVE MEAN GRADIENT: 5 MMHG
AORTIC VALVE PEAK VELOCITY: 1.45 M/S
AV PEAK GRADIENT: 8 MMHG
AVA (PEAK VEL): 2.56 CM2
AVA (VTI): 2.38 CM2
EJECTION FRACTION APICAL 4 CHAMBER: 58.6
EJECTION FRACTION: 63 %
LEFT ATRIUM VOLUME AREA LENGTH INDEX BSA: 34.9 ML/M2
LEFT VENTRICLE INTERNAL DIMENSION DIASTOLE: 3.3 CM (ref 3.5–6)
LEFT VENTRICULAR OUTFLOW TRACT DIAMETER: 2 CM
MITRAL VALVE E/A RATIO: 0.98
RIGHT VENTRICLE FREE WALL PEAK S': 20.4 CM/S
TRICUSPID ANNULAR PLANE SYSTOLIC EXCURSION: 3.2 CM

## 2025-07-07 PROCEDURE — 93306 TTE W/DOPPLER COMPLETE: CPT | Performed by: INTERNAL MEDICINE

## 2025-07-07 PROCEDURE — 93306 TTE W/DOPPLER COMPLETE: CPT

## 2025-07-19 DIAGNOSIS — G25.0 ESSENTIAL TREMOR: ICD-10-CM

## 2025-07-22 ENCOUNTER — APPOINTMENT (OUTPATIENT)
Dept: OTOLARYNGOLOGY | Facility: CLINIC | Age: 75
End: 2025-07-22
Payer: MEDICARE

## 2025-07-22 RX ORDER — PRIMIDONE 50 MG/1
50 TABLET ORAL NIGHTLY
Qty: 30 TABLET | Refills: 0 | Status: SHIPPED | OUTPATIENT
Start: 2025-07-22

## 2025-08-12 ENCOUNTER — APPOINTMENT (OUTPATIENT)
Dept: OTOLARYNGOLOGY | Facility: CLINIC | Age: 75
End: 2025-08-12
Payer: MEDICARE

## 2025-08-12 DIAGNOSIS — G47.33 OBSTRUCTIVE SLEEP APNEA: ICD-10-CM

## 2025-08-12 DIAGNOSIS — J34.3 HYPERTROPHY OF BOTH INFERIOR NASAL TURBINATES: ICD-10-CM

## 2025-08-12 DIAGNOSIS — J34.89 NASAL OBSTRUCTION: ICD-10-CM

## 2025-08-12 DIAGNOSIS — J34.2 DEVIATED NASAL SEPTUM: ICD-10-CM

## 2025-08-12 DIAGNOSIS — J34.89 NASAL DRAINAGE: ICD-10-CM

## 2025-08-12 DIAGNOSIS — J32.9 CHRONIC RHINOSINUSITIS: Primary | ICD-10-CM

## 2025-08-12 DIAGNOSIS — R09.81 NASAL CONGESTION: ICD-10-CM

## 2025-08-12 DIAGNOSIS — Z91.09 ENVIRONMENTAL ALLERGIES: ICD-10-CM

## 2025-08-12 PROCEDURE — 99214 OFFICE O/P EST MOD 30 MIN: CPT | Performed by: STUDENT IN AN ORGANIZED HEALTH CARE EDUCATION/TRAINING PROGRAM

## 2025-08-12 PROCEDURE — 1159F MED LIST DOCD IN RCRD: CPT | Performed by: STUDENT IN AN ORGANIZED HEALTH CARE EDUCATION/TRAINING PROGRAM

## 2025-08-12 PROCEDURE — 1036F TOBACCO NON-USER: CPT | Performed by: STUDENT IN AN ORGANIZED HEALTH CARE EDUCATION/TRAINING PROGRAM

## 2025-09-04 ENCOUNTER — OFFICE VISIT (OUTPATIENT)
Facility: CLINIC | Age: 75
End: 2025-09-04
Payer: MEDICARE

## 2025-09-04 DIAGNOSIS — E66.3 OVERWEIGHT (BMI 25.0-29.9): ICD-10-CM

## 2025-09-04 DIAGNOSIS — G47.33 OBSTRUCTIVE SLEEP APNEA: Primary | ICD-10-CM

## 2025-09-04 DIAGNOSIS — J34.2 NASAL SEPTAL DEVIATION: ICD-10-CM

## 2025-09-04 PROCEDURE — G8433 SCR FOR DEP NOT CPT DOC RSN: HCPCS | Performed by: NURSE PRACTITIONER

## 2025-09-04 PROCEDURE — 1159F MED LIST DOCD IN RCRD: CPT | Performed by: NURSE PRACTITIONER

## 2025-09-04 PROCEDURE — G2211 COMPLEX E/M VISIT ADD ON: HCPCS | Performed by: NURSE PRACTITIONER

## 2025-09-04 PROCEDURE — 99214 OFFICE O/P EST MOD 30 MIN: CPT | Performed by: NURSE PRACTITIONER

## 2025-09-04 PROCEDURE — 1036F TOBACCO NON-USER: CPT | Performed by: NURSE PRACTITIONER

## 2025-09-04 PROCEDURE — 1160F RVW MEDS BY RX/DR IN RCRD: CPT | Performed by: NURSE PRACTITIONER

## 2025-09-04 RX ORDER — AMOXICILLIN AND CLAVULANATE POTASSIUM 875; 125 MG/1; MG/1
875 TABLET, FILM COATED ORAL 2 TIMES DAILY
COMMUNITY

## 2025-09-04 ASSESSMENT — SLEEP AND FATIGUE QUESTIONNAIRES
HOW LIKELY ARE YOU TO NOD OFF OR FALL ASLEEP WHILE SITTING QUIETLY AFTER LUNCH WITHOUT ALCOHOL: SLIGHT CHANCE OF DOZING
SATISFACTION_WITH_CURRENT_SLEEP_PATTERN: DISSATISFIED
HOW LIKELY ARE YOU TO NOD OFF OR FALL ASLEEP WHEN YOU ARE A PASSENGER IN A CAR FOR AN HOUR WITHOUT A BREAK: SLIGHT CHANCE OF DOZING
DIFFICULTY_FALLING_ASLEEP: MODERATE
DIFFICULTY_STAYING_ASLEEP: MILD
HOW LIKELY ARE YOU TO NOD OFF OR FALL ASLEEP WHILE WATCHING TV: MODERATE CHANCE OF DOZING
SITING INACTIVE IN A PUBLIC PLACE LIKE A CLASS ROOM OR A MOVIE THEATER: SLIGHT CHANCE OF DOZING
HOW LIKELY ARE YOU TO NOD OFF OR FALL ASLEEP IN A CAR, WHILE STOPPED FOR A FEW MINUTES IN TRAFFIC: WOULD NEVER DOZE
ESS-CHAD TOTAL SCORE: 7
WORRIED_DISTRESSED_DUE_TO_SLEEP: SOMEWHAT
SLEEP_PROBLEM_NOTICEABLE_TO_OTHERS: SOMEWHAT
WAKING_TOO_EARLY: MODERATE
HOW LIKELY ARE YOU TO NOD OFF OR FALL ASLEEP WHILE SITTING AND READING: MODERATE CHANCE OF DOZING
HOW LIKELY ARE YOU TO NOD OFF OR FALL ASLEEP WHILE LYING DOWN TO REST IN THE AFTERNOON WHEN CIRCUMSTANCES PERMIT: WOULD NEVER DOZE
SLEEP_PROBLEM_INTERFERES_DAILY_ACTIVITIES: SOMEWHAT
HOW LIKELY ARE YOU TO NOD OFF OR FALL ASLEEP WHILE SITTING AND TALKING TO SOMEONE: WOULD NEVER DOZE

## 2025-09-04 ASSESSMENT — COLUMBIA-SUICIDE SEVERITY RATING SCALE - C-SSRS
6. HAVE YOU EVER DONE ANYTHING, STARTED TO DO ANYTHING, OR PREPARED TO DO ANYTHING TO END YOUR LIFE?: NO
1. IN THE PAST MONTH, HAVE YOU WISHED YOU WERE DEAD OR WISHED YOU COULD GO TO SLEEP AND NOT WAKE UP?: NO
2. HAVE YOU ACTUALLY HAD ANY THOUGHTS OF KILLING YOURSELF?: NO

## 2025-09-04 ASSESSMENT — ENCOUNTER SYMPTOMS
LOSS OF SENSATION IN FEET: 0
DEPRESSION: 0
OCCASIONAL FEELINGS OF UNSTEADINESS: 0

## 2025-09-05 DIAGNOSIS — G25.0 ESSENTIAL TREMOR: ICD-10-CM

## 2025-09-05 RX ORDER — PRIMIDONE 50 MG/1
50 TABLET ORAL NIGHTLY
Qty: 30 TABLET | Refills: 0 | Status: SHIPPED | OUTPATIENT
Start: 2025-09-05

## 2025-10-03 ENCOUNTER — APPOINTMENT (OUTPATIENT)
Facility: CLINIC | Age: 75
End: 2025-10-03
Payer: MEDICARE

## 2025-10-21 ENCOUNTER — APPOINTMENT (OUTPATIENT)
Dept: PRIMARY CARE | Facility: CLINIC | Age: 75
End: 2025-10-21
Payer: MEDICARE

## 2026-03-05 ENCOUNTER — APPOINTMENT (OUTPATIENT)
Facility: CLINIC | Age: 76
End: 2026-03-05
Payer: MEDICARE

## 2026-08-18 ENCOUNTER — APPOINTMENT (OUTPATIENT)
Dept: OTOLARYNGOLOGY | Facility: CLINIC | Age: 76
End: 2026-08-18
Payer: MEDICARE